# Patient Record
Sex: FEMALE | Race: BLACK OR AFRICAN AMERICAN | Employment: OTHER | URBAN - METROPOLITAN AREA
[De-identification: names, ages, dates, MRNs, and addresses within clinical notes are randomized per-mention and may not be internally consistent; named-entity substitution may affect disease eponyms.]

---

## 2022-06-18 ENCOUNTER — APPOINTMENT (OUTPATIENT)
Dept: CT IMAGING | Age: 87
DRG: 682 | End: 2022-06-18
Payer: COMMERCIAL

## 2022-06-18 ENCOUNTER — HOSPITAL ENCOUNTER (INPATIENT)
Age: 87
LOS: 6 days | Discharge: NURSING, REHAB OR LONG-TERM CARE | DRG: 682 | End: 2022-06-24
Attending: STUDENT IN AN ORGANIZED HEALTH CARE EDUCATION/TRAINING PROGRAM | Admitting: INTERNAL MEDICINE
Payer: COMMERCIAL

## 2022-06-18 ENCOUNTER — APPOINTMENT (OUTPATIENT)
Dept: GENERAL RADIOLOGY | Age: 87
DRG: 682 | End: 2022-06-18
Payer: COMMERCIAL

## 2022-06-18 DIAGNOSIS — I21.4 NSTEMI (NON-ST ELEVATED MYOCARDIAL INFARCTION) (HCC): ICD-10-CM

## 2022-06-18 DIAGNOSIS — R41.82 ALTERED MENTAL STATUS, UNSPECIFIED ALTERED MENTAL STATUS TYPE: Primary | ICD-10-CM

## 2022-06-18 LAB
ALBUMIN SERPL-MCNC: 3.6 G/DL (ref 3.4–5)
ALP BLD-CCNC: 54 U/L (ref 40–129)
ALT SERPL-CCNC: 222 U/L (ref 10–40)
ANION GAP SERPL CALCULATED.3IONS-SCNC: 13 MMOL/L (ref 3–16)
APTT: 20.7 SEC (ref 23–34.3)
AST SERPL-CCNC: 472 U/L (ref 15–37)
BACTERIA: ABNORMAL /HPF
BASOPHILS ABSOLUTE: 0 K/UL (ref 0–0.2)
BASOPHILS ABSOLUTE: 0.1 K/UL (ref 0–0.2)
BASOPHILS RELATIVE PERCENT: 0.2 %
BASOPHILS RELATIVE PERCENT: 0.3 %
BILIRUB SERPL-MCNC: 0.8 MG/DL (ref 0–1)
BILIRUBIN DIRECT: <0.2 MG/DL (ref 0–0.3)
BILIRUBIN URINE: NEGATIVE
BILIRUBIN, INDIRECT: ABNORMAL MG/DL (ref 0–1)
BLOOD SMEAR REVIEW: NORMAL
BLOOD, URINE: ABNORMAL
BUN BLDV-MCNC: 52 MG/DL (ref 7–20)
CALCIUM SERPL-MCNC: 10.3 MG/DL (ref 8.3–10.6)
CHLORIDE BLD-SCNC: 110 MMOL/L (ref 99–110)
CLARITY: ABNORMAL
CO2: 19 MMOL/L (ref 21–32)
COLOR: YELLOW
CREAT SERPL-MCNC: 3.3 MG/DL (ref 0.6–1.2)
EOSINOPHILS ABSOLUTE: 0 K/UL (ref 0–0.6)
EOSINOPHILS ABSOLUTE: 0 K/UL (ref 0–0.6)
EOSINOPHILS RELATIVE PERCENT: 0 %
EOSINOPHILS RELATIVE PERCENT: 0 %
GFR AFRICAN AMERICAN: 16
GFR NON-AFRICAN AMERICAN: 13
GLUCOSE BLD-MCNC: 133 MG/DL (ref 70–99)
GLUCOSE BLD-MCNC: 141 MG/DL (ref 70–99)
GLUCOSE BLD-MCNC: 171 MG/DL (ref 70–99)
GLUCOSE BLD-MCNC: 212 MG/DL (ref 70–99)
GLUCOSE BLD-MCNC: 36 MG/DL (ref 70–99)
GLUCOSE BLD-MCNC: 47 MG/DL (ref 70–99)
GLUCOSE BLD-MCNC: 48 MG/DL (ref 70–99)
GLUCOSE BLD-MCNC: 60 MG/DL (ref 70–99)
GLUCOSE BLD-MCNC: 86 MG/DL (ref 70–99)
GLUCOSE URINE: NEGATIVE MG/DL
HCT VFR BLD CALC: 23.7 % (ref 36–48)
HCT VFR BLD CALC: 25 % (ref 36–48)
HEMOGLOBIN: 7.7 G/DL (ref 12–16)
HEMOGLOBIN: 8.2 G/DL (ref 12–16)
HYPOCHROMIA: ABNORMAL
INR BLD: 1.28 (ref 0.87–1.14)
KETONES, URINE: NEGATIVE MG/DL
LEUKOCYTE ESTERASE, URINE: NEGATIVE
LYMPHOCYTES ABSOLUTE: 0.9 K/UL (ref 1–5.1)
LYMPHOCYTES ABSOLUTE: 1.2 K/UL (ref 1–5.1)
LYMPHOCYTES RELATIVE PERCENT: 6.4 %
LYMPHOCYTES RELATIVE PERCENT: 6.7 %
MCH RBC QN AUTO: 30.5 PG (ref 26–34)
MCH RBC QN AUTO: 30.7 PG (ref 26–34)
MCHC RBC AUTO-ENTMCNC: 32.4 G/DL (ref 31–36)
MCHC RBC AUTO-ENTMCNC: 32.8 G/DL (ref 31–36)
MCV RBC AUTO: 93.5 FL (ref 80–100)
MCV RBC AUTO: 94.3 FL (ref 80–100)
MICROSCOPIC EXAMINATION: YES
MONOCYTES ABSOLUTE: 1.1 K/UL (ref 0–1.3)
MONOCYTES ABSOLUTE: 1.6 K/UL (ref 0–1.3)
MONOCYTES RELATIVE PERCENT: 8 %
MONOCYTES RELATIVE PERCENT: 9.1 %
NEUTROPHILS ABSOLUTE: 11.9 K/UL (ref 1.7–7.7)
NEUTROPHILS ABSOLUTE: 15.2 K/UL (ref 1.7–7.7)
NEUTROPHILS RELATIVE PERCENT: 84.2 %
NEUTROPHILS RELATIVE PERCENT: 85.1 %
NITRITE, URINE: NEGATIVE
PDW BLD-RTO: 13.3 % (ref 12.4–15.4)
PDW BLD-RTO: 13.6 % (ref 12.4–15.4)
PERFORMED ON: ABNORMAL
PERFORMED ON: NORMAL
PH UA: 5.5 (ref 5–8)
PLATELET # BLD: 45 K/UL (ref 135–450)
PLATELET # BLD: 60 K/UL (ref 135–450)
PLATELET SLIDE REVIEW: ABNORMAL
PMV BLD AUTO: 10.9 FL (ref 5–10.5)
PMV BLD AUTO: 10.9 FL (ref 5–10.5)
POLYCHROMASIA: ABNORMAL
POTASSIUM REFLEX MAGNESIUM: 4.2 MMOL/L (ref 3.5–5.1)
PRO-BNP: 7360 PG/ML (ref 0–449)
PROCALCITONIN: 18.84 NG/ML (ref 0–0.15)
PROTEIN UA: 100 MG/DL
PROTHROMBIN TIME: 15.9 SEC (ref 11.7–14.5)
RBC # BLD: 2.51 M/UL (ref 4–5.2)
RBC # BLD: 2.67 M/UL (ref 4–5.2)
RBC UA: ABNORMAL /HPF (ref 0–4)
SLIDE REVIEW: ABNORMAL
SODIUM BLD-SCNC: 142 MMOL/L (ref 136–145)
SPECIFIC GRAVITY UA: >=1.03 (ref 1–1.03)
TOTAL PROTEIN: 6.7 G/DL (ref 6.4–8.2)
TROPONIN: 0.7 NG/ML
TROPONIN: 0.7 NG/ML
URINE REFLEX TO CULTURE: YES
URINE TYPE: ABNORMAL
UROBILINOGEN, URINE: 0.2 E.U./DL
WBC # BLD: 14 K/UL (ref 4–11)
WBC # BLD: 18.1 K/UL (ref 4–11)
WBC UA: ABNORMAL /HPF (ref 0–5)

## 2022-06-18 PROCEDURE — 84484 ASSAY OF TROPONIN QUANT: CPT

## 2022-06-18 PROCEDURE — 82607 VITAMIN B-12: CPT

## 2022-06-18 PROCEDURE — 74176 CT ABD & PELVIS W/O CONTRAST: CPT

## 2022-06-18 PROCEDURE — 6360000002 HC RX W HCPCS: Performed by: STUDENT IN AN ORGANIZED HEALTH CARE EDUCATION/TRAINING PROGRAM

## 2022-06-18 PROCEDURE — 87077 CULTURE AEROBIC IDENTIFY: CPT

## 2022-06-18 PROCEDURE — 51702 INSERT TEMP BLADDER CATH: CPT

## 2022-06-18 PROCEDURE — 83550 IRON BINDING TEST: CPT

## 2022-06-18 PROCEDURE — 6370000000 HC RX 637 (ALT 250 FOR IP): Performed by: STUDENT IN AN ORGANIZED HEALTH CARE EDUCATION/TRAINING PROGRAM

## 2022-06-18 PROCEDURE — 93005 ELECTROCARDIOGRAM TRACING: CPT | Performed by: STUDENT IN AN ORGANIZED HEALTH CARE EDUCATION/TRAINING PROGRAM

## 2022-06-18 PROCEDURE — 87186 SC STD MICRODIL/AGAR DIL: CPT

## 2022-06-18 PROCEDURE — 36415 COLL VENOUS BLD VENIPUNCTURE: CPT

## 2022-06-18 PROCEDURE — 80076 HEPATIC FUNCTION PANEL: CPT

## 2022-06-18 PROCEDURE — 99285 EMERGENCY DEPT VISIT HI MDM: CPT

## 2022-06-18 PROCEDURE — 2060000000 HC ICU INTERMEDIATE R&B

## 2022-06-18 PROCEDURE — 70450 CT HEAD/BRAIN W/O DYE: CPT

## 2022-06-18 PROCEDURE — 87040 BLOOD CULTURE FOR BACTERIA: CPT

## 2022-06-18 PROCEDURE — 85610 PROTHROMBIN TIME: CPT

## 2022-06-18 PROCEDURE — 85730 THROMBOPLASTIN TIME PARTIAL: CPT

## 2022-06-18 PROCEDURE — 81001 URINALYSIS AUTO W/SCOPE: CPT

## 2022-06-18 PROCEDURE — 80048 BASIC METABOLIC PNL TOTAL CA: CPT

## 2022-06-18 PROCEDURE — 87086 URINE CULTURE/COLONY COUNT: CPT

## 2022-06-18 PROCEDURE — 84145 PROCALCITONIN (PCT): CPT

## 2022-06-18 PROCEDURE — 2580000003 HC RX 258: Performed by: STUDENT IN AN ORGANIZED HEALTH CARE EDUCATION/TRAINING PROGRAM

## 2022-06-18 PROCEDURE — 82746 ASSAY OF FOLIC ACID SERUM: CPT

## 2022-06-18 PROCEDURE — 85025 COMPLETE CBC W/AUTO DIFF WBC: CPT

## 2022-06-18 PROCEDURE — 83540 ASSAY OF IRON: CPT

## 2022-06-18 PROCEDURE — 71045 X-RAY EXAM CHEST 1 VIEW: CPT

## 2022-06-18 PROCEDURE — 83880 ASSAY OF NATRIURETIC PEPTIDE: CPT

## 2022-06-18 RX ORDER — HEPARIN SODIUM 1000 [USP'U]/ML
60 INJECTION, SOLUTION INTRAVENOUS; SUBCUTANEOUS ONCE
Status: COMPLETED | OUTPATIENT
Start: 2022-06-18 | End: 2022-06-18

## 2022-06-18 RX ORDER — HEPARIN SODIUM 10000 [USP'U]/100ML
5-30 INJECTION, SOLUTION INTRAVENOUS CONTINUOUS
Status: DISCONTINUED | OUTPATIENT
Start: 2022-06-18 | End: 2022-06-18

## 2022-06-18 RX ORDER — SODIUM CHLORIDE 0.9 % (FLUSH) 0.9 %
5-40 SYRINGE (ML) INJECTION EVERY 12 HOURS SCHEDULED
Status: DISCONTINUED | OUTPATIENT
Start: 2022-06-18 | End: 2022-06-24 | Stop reason: HOSPADM

## 2022-06-18 RX ORDER — ONDANSETRON 4 MG/1
4 TABLET, ORALLY DISINTEGRATING ORAL EVERY 8 HOURS PRN
Status: DISCONTINUED | OUTPATIENT
Start: 2022-06-18 | End: 2022-06-24 | Stop reason: HOSPADM

## 2022-06-18 RX ORDER — ATORVASTATIN CALCIUM 40 MG/1
40 TABLET, FILM COATED ORAL NIGHTLY
Status: DISCONTINUED | OUTPATIENT
Start: 2022-06-18 | End: 2022-06-19

## 2022-06-18 RX ORDER — DONEPEZIL HYDROCHLORIDE 10 MG/1
10 TABLET, FILM COATED ORAL NIGHTLY
Status: DISCONTINUED | OUTPATIENT
Start: 2022-06-18 | End: 2022-06-18

## 2022-06-18 RX ORDER — ASPIRIN 300 MG/1
300 SUPPOSITORY RECTAL DAILY
Status: DISCONTINUED | OUTPATIENT
Start: 2022-06-19 | End: 2022-06-19

## 2022-06-18 RX ORDER — ACETAMINOPHEN 325 MG/1
650 TABLET ORAL EVERY 6 HOURS PRN
Status: DISCONTINUED | OUTPATIENT
Start: 2022-06-18 | End: 2022-06-24 | Stop reason: HOSPADM

## 2022-06-18 RX ORDER — ONDANSETRON 2 MG/ML
4 INJECTION INTRAMUSCULAR; INTRAVENOUS EVERY 6 HOURS PRN
Status: DISCONTINUED | OUTPATIENT
Start: 2022-06-18 | End: 2022-06-24 | Stop reason: HOSPADM

## 2022-06-18 RX ORDER — SODIUM CHLORIDE 9 MG/ML
INJECTION, SOLUTION INTRAVENOUS CONTINUOUS
Status: DISCONTINUED | OUTPATIENT
Start: 2022-06-18 | End: 2022-06-19

## 2022-06-18 RX ORDER — HEPARIN SODIUM 1000 [USP'U]/ML
60 INJECTION, SOLUTION INTRAVENOUS; SUBCUTANEOUS PRN
Status: DISCONTINUED | OUTPATIENT
Start: 2022-06-18 | End: 2022-06-18

## 2022-06-18 RX ORDER — DULOXETIN HYDROCHLORIDE 20 MG/1
20 CAPSULE, DELAYED RELEASE ORAL DAILY
Status: DISCONTINUED | OUTPATIENT
Start: 2022-06-18 | End: 2022-06-18

## 2022-06-18 RX ORDER — DEXTROSE MONOHYDRATE 50 MG/ML
INJECTION, SOLUTION INTRAVENOUS CONTINUOUS
Status: DISCONTINUED | OUTPATIENT
Start: 2022-06-18 | End: 2022-06-19

## 2022-06-18 RX ORDER — ASPIRIN 81 MG/1
81 TABLET, CHEWABLE ORAL DAILY
Status: CANCELLED | OUTPATIENT
Start: 2022-06-19

## 2022-06-18 RX ORDER — ACETAMINOPHEN 650 MG/1
650 SUPPOSITORY RECTAL EVERY 6 HOURS PRN
Status: DISCONTINUED | OUTPATIENT
Start: 2022-06-18 | End: 2022-06-24 | Stop reason: HOSPADM

## 2022-06-18 RX ORDER — ASPIRIN 300 MG/1
300 SUPPOSITORY RECTAL ONCE
Status: COMPLETED | OUTPATIENT
Start: 2022-06-18 | End: 2022-06-18

## 2022-06-18 RX ORDER — SODIUM CHLORIDE 0.9 % (FLUSH) 0.9 %
5-40 SYRINGE (ML) INJECTION PRN
Status: DISCONTINUED | OUTPATIENT
Start: 2022-06-18 | End: 2022-06-24 | Stop reason: HOSPADM

## 2022-06-18 RX ORDER — SODIUM CHLORIDE 9 MG/ML
INJECTION, SOLUTION INTRAVENOUS PRN
Status: DISCONTINUED | OUTPATIENT
Start: 2022-06-18 | End: 2022-06-24 | Stop reason: HOSPADM

## 2022-06-18 RX ORDER — HEPARIN SODIUM 1000 [USP'U]/ML
30 INJECTION, SOLUTION INTRAVENOUS; SUBCUTANEOUS PRN
Status: DISCONTINUED | OUTPATIENT
Start: 2022-06-18 | End: 2022-06-18

## 2022-06-18 RX ORDER — POLYETHYLENE GLYCOL 3350 17 G/17G
17 POWDER, FOR SOLUTION ORAL DAILY PRN
Status: DISCONTINUED | OUTPATIENT
Start: 2022-06-18 | End: 2022-06-24 | Stop reason: HOSPADM

## 2022-06-18 RX ORDER — DEXTROSE MONOHYDRATE 50 MG/ML
100 INJECTION, SOLUTION INTRAVENOUS PRN
Status: DISCONTINUED | OUTPATIENT
Start: 2022-06-18 | End: 2022-06-24 | Stop reason: HOSPADM

## 2022-06-18 RX ADMIN — DEXTROSE MONOHYDRATE 125 ML: 10 INJECTION, SOLUTION INTRAVENOUS at 17:35

## 2022-06-18 RX ADMIN — CEFEPIME HYDROCHLORIDE 1000 MG: 1 INJECTION, POWDER, FOR SOLUTION INTRAMUSCULAR; INTRAVENOUS at 19:36

## 2022-06-18 RX ADMIN — DEXTROSE MONOHYDRATE: 50 INJECTION, SOLUTION INTRAVENOUS at 19:29

## 2022-06-18 RX ADMIN — ASPIRIN 300 MG: 300 SUPPOSITORY RECTAL at 12:29

## 2022-06-18 RX ADMIN — DEXTROSE MONOHYDRATE 125 ML: 10 INJECTION, SOLUTION INTRAVENOUS at 18:17

## 2022-06-18 RX ADMIN — HEPARIN SODIUM AND DEXTROSE 12 UNITS/KG/HR: 10000; 5 INJECTION INTRAVENOUS at 13:27

## 2022-06-18 RX ADMIN — DEXTROSE MONOHYDRATE 125 ML: 10 INJECTION, SOLUTION INTRAVENOUS at 17:55

## 2022-06-18 RX ADMIN — HEPARIN SODIUM 2950 UNITS: 1000 INJECTION, SOLUTION INTRAVENOUS; SUBCUTANEOUS at 13:22

## 2022-06-18 RX ADMIN — DEXTROSE MONOHYDRATE 250 ML: 10 INJECTION, SOLUTION INTRAVENOUS at 19:02

## 2022-06-18 ASSESSMENT — PAIN SCALES - WONG BAKER
WONGBAKER_NUMERICALRESPONSE: 0

## 2022-06-18 ASSESSMENT — PAIN - FUNCTIONAL ASSESSMENT
PAIN_FUNCTIONAL_ASSESSMENT: CRITICAL CARE PAIN OBSERVATION TOOL (CPOT)
PAIN_FUNCTIONAL_ASSESSMENT: NONE - DENIES PAIN

## 2022-06-18 NOTE — ED PROVIDER NOTES
ED Attending Attestation Note     Date of evaluation: 6/18/2022    This patient was seen by the resident. I have seen and examined the patient, agree with the workup, evaluation, management and diagnosis. The care plan has been discussed. I have reviewed the ECG and concur with the resident's interpretation. My assessment reveals patient brought in by EMS with concern for STEMI with elevations in the lateral and inferior leads. Hemodynamically stable with a mildly hypoxic on arrival.  She seems to be altered though there is unclear what her baseline is. EMS states that family is coming to bedside. On exam, she is initially not following commands. She does verbalize still is difficult to understand what she is saying. Equal radial pulses on initial examination. Abdomen is soft and nondistended and nontender. Breath sounds equal bilaterally. EKG does show elevations in inferior and lateral leads. We will activate Cath Lab and speak with our interventional list due to concern for STEMI. Due to her altered mental status, we will also obtain a rapid CT head to look for any potential bleeding.      Renato Wright MD  06/18/22 [id number]

## 2022-06-18 NOTE — H&P
Conjunctiva/sclera: Conjunctivae normal.   Cardiovascular:      Rate and Rhythm: Normal rate and regular rhythm. Pulmonary:      Effort: Pulmonary effort is normal. No respiratory distress. Breath sounds: Normal breath sounds. No wheezing or rales. Abdominal:      General: Abdomen is flat. Palpations: Abdomen is soft. Musculoskeletal:      Comments: LLE skin dry indurated, open skin wound   Skin:     General: Skin is dry. Neurological:      Mental Status: She is disoriented. Physical exam:       Vitals:    06/18/22 1321   BP:    Pulse: 62   Resp: 14   Temp: 99 °F (37.2 °C)   SpO2:        DATA:    Labs:  CBC:   Recent Labs     06/18/22  1135   WBC 14.0*   HGB 7.7*   HCT 23.7*   PLT 60*       BMP:   Recent Labs     06/18/22  1135      K 4.2      CO2 19*   BUN 52*   CREATININE 3.3*   GLUCOSE 133*     LFT's: No results for input(s): AST, ALT, ALB, BILITOT, ALKPHOS in the last 72 hours. Troponin:   Recent Labs     06/18/22  1135   TROPONINI 0.70*     BNP:No results for input(s): BNP in the last 72 hours. ABGs: No results for input(s): PHART, SHW6FNP, PO2ART in the last 72 hours. INR:   Recent Labs     06/18/22  1136   INR 1.28*       U/A:No results for input(s): NITRITE, COLORU, PHUR, LABCAST, WBCUA, RBCUA, MUCUS, TRICHOMONAS, YEAST, BACTERIA, CLARITYU, SPECGRAV, LEUKOCYTESUR, UROBILINOGEN, BILIRUBINUR, BLOODU, GLUCOSEU, AMORPHOUS in the last 72 hours. Invalid input(s): KETONESU    XR CHEST PORTABLE   Final Result      No acute cardiopulmonary findings. CT HEAD WO CONTRAST   Final Result      Cerebral atrophy. Evidence of diffuse chronic small vessel white matter disease bilaterally. No acute intracranial findings. ASSESSMENT AND PLAN:    STEMI  Pt has reported history of CAD. ECHO 2013 EF 45-50%. Pt with ST elevations in lateral and inferior leads.  Code STEMI called though pt was too lethargic to undergo any intervention  Given ASA 300mg on CKD. Will place on gentle IVF and follow serially. Consider nephrology consult in am if needed  Evolving sepsis: WBC rising and procal elevated. Obtain cultures.  CT A/P and start empiric broad coverage for now  Anemia, thrombocytopenia:  Obtain DIC labs, nutritional markers      Stephanie Ha MD

## 2022-06-18 NOTE — Clinical Note
Discharge Plan[de-identified] Other/[facility])   Telemetry/Cardiac Monitoring Required?: Yes

## 2022-06-18 NOTE — PROGRESS NOTES
Spoke with on-call cardiologist Dr. Ebonie Young regarding continuing heparin gtt due to thrombocytopenia. He stated that pt unlikely to have STEMI due to ST elevations being diffuse, did not recommend starting pt on heparin gtt. Suggesting looking for other underlying causes for ST elevations and AMS, and to consult non-interventional cardiology if needed. This was discussed with attending physician Dr. Dean Marks, night team was updated. Heparin gtt was discontinued. Troponins, EKG, CBC will be trended. Will await further recommendations from cardiology.

## 2022-06-19 ENCOUNTER — APPOINTMENT (OUTPATIENT)
Dept: CT IMAGING | Age: 87
DRG: 682 | End: 2022-06-19
Payer: COMMERCIAL

## 2022-06-19 ENCOUNTER — APPOINTMENT (OUTPATIENT)
Dept: MRI IMAGING | Age: 87
DRG: 682 | End: 2022-06-19
Payer: COMMERCIAL

## 2022-06-19 LAB
ACETAMINOPHEN LEVEL: <5 UG/ML (ref 10–30)
ALBUMIN SERPL-MCNC: 3.1 G/DL (ref 3.4–5)
ALP BLD-CCNC: 51 U/L (ref 40–129)
ALT SERPL-CCNC: 389 U/L (ref 10–40)
AMMONIA: 34 UMOL/L (ref 11–51)
AMPHETAMINE SCREEN, URINE: NORMAL
ANION GAP SERPL CALCULATED.3IONS-SCNC: 15 MMOL/L (ref 3–16)
ANION GAP SERPL CALCULATED.3IONS-SCNC: 15 MMOL/L (ref 3–16)
AST SERPL-CCNC: 529 U/L (ref 15–37)
BARBITURATE SCREEN URINE: NORMAL
BASOPHILS ABSOLUTE: 0 K/UL (ref 0–0.2)
BASOPHILS ABSOLUTE: 0 K/UL (ref 0–0.2)
BASOPHILS RELATIVE PERCENT: 0.1 %
BASOPHILS RELATIVE PERCENT: 0.1 %
BENZODIAZEPINE SCREEN, URINE: NORMAL
BILIRUB SERPL-MCNC: 0.6 MG/DL (ref 0–1)
BILIRUBIN DIRECT: <0.2 MG/DL (ref 0–0.3)
BILIRUBIN, INDIRECT: ABNORMAL MG/DL (ref 0–1)
BUN BLDV-MCNC: 55 MG/DL (ref 7–20)
BUN BLDV-MCNC: 57 MG/DL (ref 7–20)
CALCIUM SERPL-MCNC: 9.2 MG/DL (ref 8.3–10.6)
CALCIUM SERPL-MCNC: 9.5 MG/DL (ref 8.3–10.6)
CANNABINOID SCREEN URINE: NORMAL
CHLORIDE BLD-SCNC: 105 MMOL/L (ref 99–110)
CHLORIDE BLD-SCNC: 105 MMOL/L (ref 99–110)
CHOLESTEROL, TOTAL: 125 MG/DL (ref 0–199)
CO2: 17 MMOL/L (ref 21–32)
CO2: 18 MMOL/L (ref 21–32)
COCAINE METABOLITE SCREEN URINE: NORMAL
CREAT SERPL-MCNC: 2.8 MG/DL (ref 0.6–1.2)
CREAT SERPL-MCNC: 2.9 MG/DL (ref 0.6–1.2)
CREATININE URINE: 55.4 MG/DL (ref 28–259)
CREATININE URINE: 55.5 MG/DL (ref 28–259)
EOSINOPHILS ABSOLUTE: 0 K/UL (ref 0–0.6)
EOSINOPHILS ABSOLUTE: 0 K/UL (ref 0–0.6)
EOSINOPHILS RELATIVE PERCENT: 0 %
EOSINOPHILS RELATIVE PERCENT: 0.1 %
FOLATE: >20 NG/ML (ref 4.78–24.2)
GFR AFRICAN AMERICAN: 19
GFR AFRICAN AMERICAN: 19
GFR NON-AFRICAN AMERICAN: 15
GFR NON-AFRICAN AMERICAN: 16
GLUCOSE BLD-MCNC: 108 MG/DL (ref 70–99)
GLUCOSE BLD-MCNC: 108 MG/DL (ref 70–99)
GLUCOSE BLD-MCNC: 113 MG/DL (ref 70–99)
GLUCOSE BLD-MCNC: 123 MG/DL (ref 70–99)
GLUCOSE BLD-MCNC: 128 MG/DL (ref 70–99)
GLUCOSE BLD-MCNC: 129 MG/DL (ref 70–99)
GLUCOSE BLD-MCNC: 129 MG/DL (ref 70–99)
GLUCOSE BLD-MCNC: 142 MG/DL (ref 70–99)
GLUCOSE BLD-MCNC: 145 MG/DL (ref 70–99)
GLUCOSE BLD-MCNC: 162 MG/DL (ref 70–99)
GLUCOSE BLD-MCNC: 50 MG/DL (ref 70–99)
GLUCOSE BLD-MCNC: 56 MG/DL (ref 70–99)
GLUCOSE BLD-MCNC: 59 MG/DL (ref 70–99)
GLUCOSE BLD-MCNC: 65 MG/DL (ref 70–99)
GLUCOSE BLD-MCNC: 68 MG/DL (ref 70–99)
GLUCOSE BLD-MCNC: 68 MG/DL (ref 70–99)
GLUCOSE BLD-MCNC: 69 MG/DL (ref 70–99)
GLUCOSE BLD-MCNC: 69 MG/DL (ref 70–99)
GLUCOSE BLD-MCNC: 71 MG/DL (ref 70–99)
GLUCOSE BLD-MCNC: 77 MG/DL (ref 70–99)
GLUCOSE BLD-MCNC: 94 MG/DL (ref 70–99)
GLUCOSE BLD-MCNC: 95 MG/DL (ref 70–99)
HAV IGM SER IA-ACNC: NORMAL
HCT VFR BLD CALC: 22.4 % (ref 36–48)
HCT VFR BLD CALC: 22.4 % (ref 36–48)
HDLC SERPL-MCNC: 41 MG/DL (ref 40–60)
HEMOGLOBIN: 7.3 G/DL (ref 12–16)
HEMOGLOBIN: 7.3 G/DL (ref 12–16)
HEPATITIS B CORE IGM ANTIBODY: NORMAL
HEPATITIS B SURFACE ANTIGEN INTERPRETATION: NORMAL
HEPATITIS C ANTIBODY INTERPRETATION: NORMAL
IRON SATURATION: 35 % (ref 15–50)
IRON: 66 UG/DL (ref 37–145)
LACTIC ACID: 2 MMOL/L (ref 0.4–2)
LACTIC ACID: 2.1 MMOL/L (ref 0.4–2)
LACTIC ACID: 3.8 MMOL/L (ref 0.4–2)
LDL CHOLESTEROL CALCULATED: 63 MG/DL
LYMPHOCYTES ABSOLUTE: 0.7 K/UL (ref 1–5.1)
LYMPHOCYTES ABSOLUTE: 1.1 K/UL (ref 1–5.1)
LYMPHOCYTES RELATIVE PERCENT: 6.1 %
LYMPHOCYTES RELATIVE PERCENT: 8.1 %
Lab: NORMAL
MAGNESIUM: 1.5 MG/DL (ref 1.8–2.4)
MAGNESIUM: 1.9 MG/DL (ref 1.8–2.4)
MCH RBC QN AUTO: 30.2 PG (ref 26–34)
MCH RBC QN AUTO: 30.2 PG (ref 26–34)
MCHC RBC AUTO-ENTMCNC: 32.4 G/DL (ref 31–36)
MCHC RBC AUTO-ENTMCNC: 32.7 G/DL (ref 31–36)
MCV RBC AUTO: 92.4 FL (ref 80–100)
MCV RBC AUTO: 93.1 FL (ref 80–100)
METHADONE SCREEN, URINE: NORMAL
MONOCYTES ABSOLUTE: 0.8 K/UL (ref 0–1.3)
MONOCYTES ABSOLUTE: 1.1 K/UL (ref 0–1.3)
MONOCYTES RELATIVE PERCENT: 6.5 %
MONOCYTES RELATIVE PERCENT: 8.6 %
NEUTROPHILS ABSOLUTE: 10.6 K/UL (ref 1.7–7.7)
NEUTROPHILS ABSOLUTE: 11 K/UL (ref 1.7–7.7)
NEUTROPHILS RELATIVE PERCENT: 83.2 %
NEUTROPHILS RELATIVE PERCENT: 87.2 %
OPIATE SCREEN URINE: NORMAL
OXYCODONE URINE: NORMAL
PDW BLD-RTO: 13 % (ref 12.4–15.4)
PDW BLD-RTO: 13.3 % (ref 12.4–15.4)
PERFORMED ON: ABNORMAL
PERFORMED ON: NORMAL
PH UA: 3
PHENCYCLIDINE SCREEN URINE: NORMAL
PLATELET # BLD: 41 K/UL (ref 135–450)
PLATELET # BLD: 44 K/UL (ref 135–450)
PMV BLD AUTO: 10.6 FL (ref 5–10.5)
PMV BLD AUTO: 11.1 FL (ref 5–10.5)
POTASSIUM SERPL-SCNC: 4.1 MMOL/L (ref 3.5–5.1)
POTASSIUM SERPL-SCNC: 4.4 MMOL/L (ref 3.5–5.1)
PROPOXYPHENE SCREEN: NORMAL
PROTEIN PROTEIN: 28 MG/DL
PROTEIN/CREAT RATIO: 0.5 MG/DL
RBC # BLD: 2.4 M/UL (ref 4–5.2)
RBC # BLD: 2.42 M/UL (ref 4–5.2)
SODIUM BLD-SCNC: 137 MMOL/L (ref 136–145)
SODIUM BLD-SCNC: 138 MMOL/L (ref 136–145)
SODIUM URINE: 40 MMOL/L
TOTAL CK: 6133 U/L (ref 26–192)
TOTAL IRON BINDING CAPACITY: 187 UG/DL (ref 260–445)
TOTAL PROTEIN: 6 G/DL (ref 6.4–8.2)
TRIGL SERPL-MCNC: 103 MG/DL (ref 0–150)
TROPONIN: 0.46 NG/ML
TROPONIN: 0.5 NG/ML
TROPONIN: 0.53 NG/ML
TROPONIN: 0.57 NG/ML
VANCOMYCIN RANDOM: <4 UG/ML
VITAMIN B-12: 1544 PG/ML (ref 211–911)
VLDLC SERPL CALC-MCNC: 21 MG/DL
WBC # BLD: 12.2 K/UL (ref 4–11)
WBC # BLD: 13.2 K/UL (ref 4–11)

## 2022-06-19 PROCEDURE — 80143 DRUG ASSAY ACETAMINOPHEN: CPT

## 2022-06-19 PROCEDURE — 85025 COMPLETE CBC W/AUTO DIFF WBC: CPT

## 2022-06-19 PROCEDURE — 6360000002 HC RX W HCPCS: Performed by: STUDENT IN AN ORGANIZED HEALTH CARE EDUCATION/TRAINING PROGRAM

## 2022-06-19 PROCEDURE — 84300 ASSAY OF URINE SODIUM: CPT

## 2022-06-19 PROCEDURE — 84484 ASSAY OF TROPONIN QUANT: CPT

## 2022-06-19 PROCEDURE — 2060000000 HC ICU INTERMEDIATE R&B

## 2022-06-19 PROCEDURE — 80048 BASIC METABOLIC PNL TOTAL CA: CPT

## 2022-06-19 PROCEDURE — 70551 MRI BRAIN STEM W/O DYE: CPT

## 2022-06-19 PROCEDURE — 80074 ACUTE HEPATITIS PANEL: CPT

## 2022-06-19 PROCEDURE — 93005 ELECTROCARDIOGRAM TRACING: CPT | Performed by: STUDENT IN AN ORGANIZED HEALTH CARE EDUCATION/TRAINING PROGRAM

## 2022-06-19 PROCEDURE — 80076 HEPATIC FUNCTION PANEL: CPT

## 2022-06-19 PROCEDURE — 84156 ASSAY OF PROTEIN URINE: CPT

## 2022-06-19 PROCEDURE — 82570 ASSAY OF URINE CREATININE: CPT

## 2022-06-19 PROCEDURE — 51702 INSERT TEMP BLADDER CATH: CPT

## 2022-06-19 PROCEDURE — 99223 1ST HOSP IP/OBS HIGH 75: CPT | Performed by: INTERNAL MEDICINE

## 2022-06-19 PROCEDURE — 83605 ASSAY OF LACTIC ACID: CPT

## 2022-06-19 PROCEDURE — 82550 ASSAY OF CK (CPK): CPT

## 2022-06-19 PROCEDURE — 80202 ASSAY OF VANCOMYCIN: CPT

## 2022-06-19 PROCEDURE — 36415 COLL VENOUS BLD VENIPUNCTURE: CPT

## 2022-06-19 PROCEDURE — 80307 DRUG TEST PRSMV CHEM ANLYZR: CPT

## 2022-06-19 PROCEDURE — 83735 ASSAY OF MAGNESIUM: CPT

## 2022-06-19 PROCEDURE — 71250 CT THORAX DX C-: CPT

## 2022-06-19 PROCEDURE — 87641 MR-STAPH DNA AMP PROBE: CPT

## 2022-06-19 PROCEDURE — 82947 ASSAY GLUCOSE BLOOD QUANT: CPT

## 2022-06-19 PROCEDURE — 82140 ASSAY OF AMMONIA: CPT

## 2022-06-19 PROCEDURE — 2580000003 HC RX 258: Performed by: STUDENT IN AN ORGANIZED HEALTH CARE EDUCATION/TRAINING PROGRAM

## 2022-06-19 PROCEDURE — 6360000002 HC RX W HCPCS

## 2022-06-19 PROCEDURE — 80061 LIPID PANEL: CPT

## 2022-06-19 RX ORDER — SODIUM CHLORIDE 9 MG/ML
INJECTION, SOLUTION INTRAVENOUS CONTINUOUS
Status: ACTIVE | OUTPATIENT
Start: 2022-06-19 | End: 2022-06-20

## 2022-06-19 RX ORDER — HYDROCHLOROTHIAZIDE 12.5 MG/1
12.5 CAPSULE, GELATIN COATED ORAL 2 TIMES DAILY
Status: ON HOLD | COMMUNITY
End: 2022-06-23 | Stop reason: HOSPADM

## 2022-06-19 RX ORDER — MAGNESIUM SULFATE IN WATER 40 MG/ML
4000 INJECTION, SOLUTION INTRAVENOUS ONCE
Status: COMPLETED | OUTPATIENT
Start: 2022-06-19 | End: 2022-06-19

## 2022-06-19 RX ORDER — DEXTROSE MONOHYDRATE 25 G/50ML
25 INJECTION, SOLUTION INTRAVENOUS PRN
Status: DISCONTINUED | OUTPATIENT
Start: 2022-06-19 | End: 2022-06-19

## 2022-06-19 RX ORDER — OMEGA-3S/DHA/EPA/FISH OIL/D3 300MG-1000
400 CAPSULE ORAL DAILY
COMMUNITY

## 2022-06-19 RX ORDER — MULTIVIT WITH MINERALS/LUTEIN
250 TABLET ORAL DAILY
COMMUNITY

## 2022-06-19 RX ADMIN — DEXTROSE MONOHYDRATE 125 ML: 10 INJECTION, SOLUTION INTRAVENOUS at 05:23

## 2022-06-19 RX ADMIN — CEFEPIME HYDROCHLORIDE 1000 MG: 1 INJECTION, POWDER, FOR SOLUTION INTRAMUSCULAR; INTRAVENOUS at 11:37

## 2022-06-19 RX ADMIN — MAGNESIUM SULFATE HEPTAHYDRATE 4000 MG: 4 INJECTION, SOLUTION INTRAVENOUS at 03:34

## 2022-06-19 RX ADMIN — DEXTROSE MONOHYDRATE 125 ML: 10 INJECTION, SOLUTION INTRAVENOUS at 06:02

## 2022-06-19 RX ADMIN — DEXTROSE MONOHYDRATE 125 ML: 10 INJECTION, SOLUTION INTRAVENOUS at 09:07

## 2022-06-19 RX ADMIN — DEXTROSE MONOHYDRATE 125 ML: 10 INJECTION, SOLUTION INTRAVENOUS at 08:33

## 2022-06-19 RX ADMIN — SODIUM CHLORIDE, PRESERVATIVE FREE 5 ML: 5 INJECTION INTRAVENOUS at 19:58

## 2022-06-19 RX ADMIN — SODIUM CHLORIDE, PRESERVATIVE FREE 10 ML: 5 INJECTION INTRAVENOUS at 09:59

## 2022-06-19 RX ADMIN — VANCOMYCIN HYDROCHLORIDE 1250 MG: 1 INJECTION, POWDER, LYOPHILIZED, FOR SOLUTION INTRAVENOUS at 09:57

## 2022-06-19 RX ADMIN — SODIUM CHLORIDE: 9 INJECTION, SOLUTION INTRAVENOUS at 13:05

## 2022-06-19 RX ADMIN — CEFEPIME HYDROCHLORIDE 1000 MG: 1 INJECTION, POWDER, FOR SOLUTION INTRAMUSCULAR; INTRAVENOUS at 19:58

## 2022-06-19 RX ADMIN — DEXTROSE MONOHYDRATE: 50 INJECTION, SOLUTION INTRAVENOUS at 04:02

## 2022-06-19 ASSESSMENT — PAIN SCALES - WONG BAKER
WONGBAKER_NUMERICALRESPONSE: 0

## 2022-06-19 ASSESSMENT — VISUAL ACUITY: OU: 1

## 2022-06-19 ASSESSMENT — PAIN SCALES - GENERAL
PAINLEVEL_OUTOF10: 0

## 2022-06-19 NOTE — PROGRESS NOTES
Speech Language Pathology    SLP orders received; Evaluation attempted though patient not appropriate for speech/swallow assessment per RN as pt not alert/responsive. SLP to hold and see patient when appropriate to participate.        Rhoda Vela, [place], PAL [phone]  Pager [phone]

## 2022-06-19 NOTE — PROGRESS NOTES
Clinical Pharmacy Progress Note    Vancomycin - Management by Pharmacy    Consult Date(s): 06/18  Consulting Provider(s): Inder    Assessment / Plan    Sepsis of unknown etiology - Vancomycin   Concurrent Antimicrobials: Cefepime (Day #2)   Day of Vanc Therapy / Ordered Duration: 1/7   Current Dosing Method: Intermittent Dosing by Levels   Therapeutic Goal: ~ 15 mg/L   Current Dose / Frequency: Intermittent   Plan / Rationale:   o Patient with an MIGUELITO (Scr 2.8 mg/L, baseline unknown). o Pt was ordered a LD of 1250 mg Vancomycin last night. This was never given (hence vancomycin level this AM of <4 mg/L). o Spoke with RN, will give the 1250 mg (25 mg/kg) IV dose this AM.  o Will check a random level tomorrow AM.  Reji Thomas Will continue to monitor clinical condition and make adjustments to regimen as appropriate. Thank you for consulting Pharmacy! Arjun Davies, PharmD  PGY-1 Pharmacy Resident  [id number]  6/19/2022 7:47 AM        Subjective/Objective: Ms. Tammy Mccormick is a 82 y.o. female with a PMHx significant for dementia, HLD, CAD, CKD, HFrEF, admitted for AMS. Pharmacy has been consulted to dose vancomycin for sepsis. Ht Readings from Last 1 Encounters:   06/18/22 5' (1.524 m)     Wt Readings from Last 1 Encounters:   06/19/22 107 lb 2.3 oz (48.6 kg)       Current & Prior Antimicrobial Regimen(s):   Cefepime (06/18 - Current)   Vancomycin (06/19 - Current)    Level(s) / Doses:    Date Dose Level Notes   06/18 1250 mg IV  NOT GIVEN   06/19 1250 mg IV <4 mg/L Repeat dose that was not give   Note: Serum levels collected for AUC-based dosing may be high if collected in close proximity to the dose administered. This is not necessarily indicative of toxicity. Cultures & Sensitivities:    Date Site Micro Susceptibility / Result   06/18 Blood x2  Sent            Labs / Ancillary Data:    Estimated Creatinine Clearance: 11 mL/min (A) (based on SCr of 2.8 mg/dL (H)).     Recent Labs 06/18/22  1135 06/18/22  1408 06/19/22  0032 06/19/22  0528   CREATININE 3.3*  --  2.9* 2.8*   BUN 52*  --  57* 55*   WBC 14.0* 18.1* 13.2*  --        Additional Lab Values / Findings of Note:    Procalcitonin:   Recent Labs     06/18/22  1345   PROCAL 18.84*

## 2022-06-19 NOTE — CONSULTS
[address]   Cardiology Consultation   Date: 6/19/2022  Admit Date:  6/18/2022  Reason for Consultation: ? STEMI  Consult Requesting Physician: Jered Narvaez MD     Chief Complaint   Patient presents with    Altered Mental Status     usually A/O from home, per family has been unresponsive for about an hour, per EMS STEMI, withdraws from pain     HPI: Roseann Martin is a 82 y.o. past medical history significant for CKD, hypertension, hyperlipidemia, cardiomyopathy with LV ejection fraction of 45 to 50%, severe LVH, grade 3 diastolic dysfunction, no major obstructive CAD in 2013, was brought into the hospital via EMS secondary to altered mental status. History is limited from the patient. She is alert and awake. However, was not able to provide much history. In the ER, there was a concern about ST segment elevation and hence she was diagnosed with ST elevation MI. She was also confused in the ER. Interventional cardiology Dr. Leonel Alvarado was contacted from the ER. Secondary to atypical EKG changes and mental status changes, with no clear history of chest pain, she was not taken to the cardiac catheterization lab, very appropriately. Cardiology was consulted for further evaluation management. Patient continues to be lethargic. She would wake up and go back to sleep. Not able to get much history from the patient. Her EKG is consistent with a benign form of early repolarization syndrome. Her troponin started at 0.7 and the last troponin is 0.5. Her further lab work is consistent with possible sepsis. Impression:   1. NO STEMI  2. Early repolarization syndrome, benign form  3. Sepsis    Recommendations:  1. Please note that her EKG is not consistent with a ST segment elevation MI. It is more in favor of early repolarization syndrome. When compared to the previous EKG from 2015, there is no change in her EKG.   2.  Treatment for sepsis, as per the primary team  3.  Echocardiogram tomorrow  4. Agree that there is no need for IV anticoagulation    Dr. 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 No pertinent surgical history. No Known Allergies    Social History:  Reviewed. has an unknown smoking status. She has never used smokeless tobacco. She reports that she does not drink alcohol and does not use drugs. Family History:  Reviewed. family history is not on file. No premature CAD. Review of System:  All other systems reviewed except for that noted above. Pertinent negatives and positives are:     · General: negative for fever, chills   · Ophthalmic ROS: negative for - eye pain or loss of vision  · ENT ROS: negative for - headaches, sore throat   · Respiratory: negative for - cough, sputum  · Cardiovascular: Reviewed in HPI  · Gastrointestinal: negative for - abdominal pain, diarrhea, N/V  · Hematology: negative for - bleeding, blood clots, bruising or jaundice  · Genito-Urinary:  negative for - Dysuria or incontinence  · Musculoskeletal: negative for - Joint swelling, muscle pain  · Neurological: negative for - confusion, dizziness, headaches   · Psychiatric: No anxiety, no depression.   · Dermatological: negative for - rash    Physical Examination:  Vitals:    06/19/22 0320   BP: (!) 99/58   Pulse: 55   Resp: 14   Temp: 98.1 °F (36.7 °C)   SpO2: 100%        Intake/Output Summary (Last 24 hours) at 6/19/2022 1110  Last data filed at 6/19/2022 0320  Gross per 24 hour   Intake 1410.67 ml   Output 500 ml   Net 910.67 ml     In: 1410.7 [I.V.:1368.8]  Out: 500    Wt Readings from Last 3 Encounters:   06/19/22 107 lb 2.3 oz (48.6 kg)     Temp  Av °F (36.7 °C)  Min: 97.4 °F (36.3 °C)  Max: 99 °F (37.2 °C)  Pulse  Av.5  Min: 55  Max: 68  BP  Min: 99/58  Max: 152/66  SpO2  Av.5 %  Min: 97 %  Max: 100 %    · Telemetry: Sinus rhythm   · Constitutional: Alert. · Head: Normocephalic and atraumatic. · Mouth/Throat: Lips appear moist. Oropharynx is clear and moist.  · Eyes: Conjunctivae normal. EOM are normal.   · Neck: Neck supple. No lymphadenopathy. No rigidity. No JVD present. · Cardiovascular: Normal rate, regular rhythm. Normal S1&S2. Carotid pulse 2+ bilaterally. · Pulmonary/Chest: Bilateral respiratory sounds present. No respiratory accessory muscle use. No wheezes, No rhonchi. · Abdominal: Soft. Normal bowel sounds present. No distension, No tenderness. No splenomegaly. No hernia. · Musculoskeletal: No tenderness. No edema    · Lymphadenopathy: Has no cervical adenopathy. · Neurological: Alert and oriented. Cranial nerve II-XII grossly intact, No gross deficit to touch. · Skin: Skin is warm and dry. No rash, lesions, ulcerations noted. · Psychiatric: No anxiety nor agitation. Labs:  Reviewed. Recent Labs     22  1135 22  0032 22  0528    137 138   K 4.2 4.1 4.4    105 105   CO2 19* 17* 18*   BUN 52* 57* 55*   CREATININE 3.3* 2.9* 2.8*     Recent Labs     22  1135 22  1408 22  0032   WBC 14.0* 18.1* 13.2*   HGB 7.7* 8.2* 7.3*   HCT 23.7* 25.0* 22.4*   MCV 94.3 93.5 93.1   PLT 60* 45* 44*     Lab Results   Component Value Date    TROPONINI 0.53 2022     Lab Results   Component Value Date    .0 2013     Lab Results   Component Value Date    PROTIME 15.9 2022    PROTIME 14.4 2013    INR 1.28 2022    INR 1.14 2013     Lab Results   Component Value Date    CHOL 125 2013    HDL 41 2013    TRIG 77 2013        independently reviewed the ECG and telemetry.     Scheduled Meds:   cefepime 1,000 mg IntraVENous Q12H    vancomycin  1,250 mg IntraVENous Once    [Held by provider] atorvastatin  40 mg Oral Nightly    sodium chloride flush  5-40 mL IntraVENous 2 times per day    aspirin  300 mg Rectal Daily    vancomycin (VANCOCIN) intermittent dosing (placeholder)   Other RX Placeholder     Continuous Infusions:   sodium chloride      sodium chloride      dextrose 100 mL/hr (06/19/22 0814)    dextrose 100 mL/hr at 06/19/22 0402     PRN Meds:.sodium chloride flush, sodium chloride, ondansetron **OR** ondansetron, acetaminophen **OR** acetaminophen, polyethylene glycol, glucose, dextrose bolus **OR** dextrose bolus, glucagon (rDNA), dextrose, perflutren lipid microspheres     Assessment:   Patient Active Problem List    Diagnosis Date Noted    Chronic combined systolic and diastolic CHF (congestive heart failure) ([facility] 08/08/2013    Syncope 08/05/2013    Bradycardia 08/05/2013    Chronic kidney disease (CKD), stage III (moderate) ([facility] 02/20/2013    Dementia ([facility] 02/20/2013    STEMI (ST elevation myocardial infarction) ([facility] 02/19/2013      Active Hospital Problems    Diagnosis Date Noted    STEMI (ST elevation myocardial infarction) ([facility] [I21.3] 02/19/2013       Thank you for allowing me to participate in the care of Vinay Kowalski . If you have any questions/comments, please do not hesitate to contact us. Ravinder Bui MD   Cardiac Electrophysiology  [facility] [phone]    For any EP related issues after 5 PM, contact Era Caputo on call cardiology through .

## 2022-06-19 NOTE — PROGRESS NOTES
Late entry at 0900, pt having low blood sugars ranging 50-60s. Fingers cold therefore resulting possible false readings. D5W infusing per order. Lab glucose result at 0528 this am 128. Patient's fingers warmed with warm compress and heating pad. Blood sugars rechecked after fingers warmed and results showing 95. Patient continued to have blood sugars above 70 with warmed fingers  with one exception of a blood sugar 59 of which fingers were cold at that time. Blood sugar rechecked after fingers re warmed and the result was 113. Dr. Johnson Offer informed of above and DCd the D5W continuous fluids. Normal saline infusing per order at 75cc/hr and blood sugar stable at 123. Patient more alert and speech more clear. Patient now AO to person, place, but not sure on the month and year. Will continue to monitor.

## 2022-06-19 NOTE — PROGRESS NOTES
4 Eyes Admission Assessment     I agree as the admission nurse that 2 RN's have performed a thorough Head to Toe Skin Assessment on the patient. ALL assessment sites listed below have been assessed on admission. Areas assessed by both nurses:   Corinne, and jenny   [x]   Head, Face, and Ears   [x]   Shoulders, Back, and Chest  [x]   Arms, Elbows, and Hands   [x]   Coccyx, Sacrum, and Ischium  [x]   Legs, Feet, and Heels  Patient had scattered abrasions, and brusing. there were x3 small skin tears noted to LLE, x2 calf area, and and ankle. Does the Patient have Skin Breakdown? No         Justin Prevention initiated:  No   Wound Care Orders initiated:  No      [facility] nurse consulted for Pressure Injury (Stage 3,4, Unstageable, DTI, NWPT, and Complex wounds) or Justin score 18 or lower:  No      Nurse 1 eSignature: Electronically signed by Rakel Cha RN on 6/19/22 at 9:00 AM EDT    SHARE this note so that the co-signing nurse is able to place an eSignature. . signed by this nurse and accidentally signed twice by ASHLEY Mckeon RN.     Nurse 2 eSignature: Electronically signed by Rakel Cha RN on 6/19/22 at 9:02 AM EDT
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
## 2022-06-19 NOTE — PLAN OF CARE
Problem: Discharge Planning  Goal: Discharge to home or other facility with appropriate resources  Outcome: Progressing     Problem: Pain  Goal: Verbalizes/displays adequate comfort level or baseline comfort level  Outcome: Progressing  Flowsheets (Taken 6/18/2022 1945 by Rory Dancer, RN)  Verbalizes/displays adequate comfort level or baseline comfort level:   Encourage patient to monitor pain and request assistance   Assess pain using appropriate pain scale   Administer analgesics based on type and severity of pain and evaluate response   Implement non-pharmacological measures as appropriate and evaluate response     Problem: Safety - Adult  Goal: Free from fall injury  Outcome: Progressing     Problem: Neurosensory - Adult  Goal: Achieves stable or improved neurological status  Outcome: Progressing     Problem: Cardiovascular - Adult  Goal: Absence of cardiac dysrhythmias or at baseline  Outcome: Progressing     Problem: Respiratory - Adult  Goal: Achieves optimal ventilation and oxygenation  Outcome: Progressing     Problem: Genitourinary - Adult  Goal: Urinary catheter remains patent  Outcome: Progressing     Problem: Metabolic/Fluid and Electrolytes - Adult  Goal: Electrolytes maintained within normal limits  Outcome: Progressing     Problem: Skin/Tissue Integrity - Adult  Goal: Skin integrity remains intact  Outcome: Progressing     Problem: Musculoskeletal - Adult  Goal: Return mobility to safest level of function  Outcome: Progressing

## 2022-06-19 NOTE — PROGRESS NOTES
Pharmacy Note - Extended Infusion Beta-Lactam Adjustment    Cefepime ordered for treatment of Sepsis. Per [address] Extended Infusion Beta-Lactam Policy, Cefepime will be changed to 1000 mg IV EI q12h. Estimated Creatinine Clearance: Estimated Creatinine Clearance: 11 mL/min (A) (based on SCr of 2.8 mg/dL (H)). Dialysis Status, MIGUELITO, CKD: MIGUELITO - Baseline SCr ~1.5  BMI: Body mass index is 20.93 kg/m². Rationale for Adjustment: Agent is renally eliminated and demonstrates time-dependent effect on bacterial eradication. Extended-infusion dosing strategy aims to enhance microbiologic and clinical efficacy. Based on indication and renal function, will adjust dose as above. Pharmacy will continue to monitor renal function and adjust dose as necessary. Please call with any questions.     Idalia Reyna PharmD, [facility]  Wireless: [id number]   6/19/2022 7:33 AM

## 2022-06-19 NOTE — PLAN OF CARE
Problem: Discharge Planning  Goal: Discharge to home or other facility with appropriate resources  6/19/2022 0843 by Jasmeet Reeves RN  Outcome: Progressing  6/18/2022 2034 by Jas Valdez RN  Outcome: Progressing  Flowsheets (Taken 6/18/2022 1945 by Jasmeet Reeves RN)  Discharge to home or other facility with appropriate resources:   Identify barriers to discharge with patient and caregiver   Arrange for needed discharge resources and transportation as appropriate   Identify discharge learning needs (meds, wound care, etc)     Problem: Pain  Goal: Verbalizes/displays adequate comfort level or baseline comfort level  6/19/2022 0843 by Jasmeet Reeves RN  Outcome: Progressing  6/18/2022 2034 by Jas Valdez RN  Outcome: Progressing  Flowsheets (Taken 6/18/2022 1945 by Jasmeet Reeves RN)  Verbalizes/displays adequate comfort level or baseline comfort level:   Encourage patient to monitor pain and request assistance   Assess pain using appropriate pain scale   Administer analgesics based on type and severity of pain and evaluate response   Implement non-pharmacological measures as appropriate and evaluate response     Problem: Safety - Adult  Goal: Free from fall injury  6/19/2022 0843 by Jasmeet Reeves RN  Outcome: Progressing  Flowsheets (Taken 6/18/2022 2320)  Free From Fall Injury:   Instruct family/caregiver on patient safety   Based on caregiver fall risk screen, instruct family/caregiver to ask for assistance with transferring infant if caregiver noted to have fall risk factors  6/18/2022 2034 by Jas Valdez RN  Outcome: Progressing     Problem: Neurosensory - Adult  Goal: Achieves stable or improved neurological status  6/19/2022 0843 by Jasmeet Reeves RN  Outcome: Progressing  6/18/2022 2034 by Jas Valdez RN  Outcome: Progressing  Flowsheets (Taken 6/18/2022 1945 by Jasmeet Reeves RN)  Achieves stable or improved neurological status: Assess for and report changes in neurological status   Initiate measures to prevent increased intracranial pressure   Maintain blood pressure and fluid volume within ordered parameters to optimize cerebral perfusion and minimize risk of hemorrhage   Monitor temperature, glucose, and sodium.  Initiate appropriate interventions as ordered     Problem: Cardiovascular - Adult  Goal: Absence of cardiac dysrhythmias or at baseline  6/19/2022 0843 by Ezio Guerrero RN  Outcome: Progressing  6/18/2022 2034 by Winter Andino RN  Outcome: Progressing  Flowsheets (Taken 6/18/2022 1945 by Ezio Guerrero RN)  Absence of cardiac dysrhythmias or at baseline:   Monitor cardiac rate and rhythm   Assess for signs of decreased cardiac output     Problem: Respiratory - Adult  Goal: Achieves optimal ventilation and oxygenation  6/19/2022 0843 by Ezio Guerrero RN  Outcome: Progressing  6/18/2022 2034 by Winter Andino RN  Outcome: Progressing  Flowsheets (Taken 6/18/2022 1945 by Ezio Guerrero RN)  Achieves optimal ventilation and oxygenation:   Assess for changes in respiratory status   Assess for changes in mentation and behavior   Position to facilitate oxygenation and minimize respiratory effort   Oxygen supplementation based on oxygen saturation or arterial blood gases   Encourage broncho-pulmonary hygiene including cough, deep breathe, incentive spirometry   Assess the need for suctioning and aspirate as needed   Assess and instruct to report shortness of breath or any respiratory difficulty   Respiratory therapy support as indicated     Problem: Genitourinary - Adult  Goal: Urinary catheter remains patent  6/19/2022 0843 by Ezio Guerrero RN  Outcome: Progressing  6/18/2022 2034 by Winter Andino RN  Outcome: Progressing  Flowsheets (Taken 6/18/2022 1945 by Ezio Guerrero RN)  Urinary catheter remains patent: Assess patency of urinary catheter     Problem: Metabolic/Fluid and Electrolytes - Adult  Goal: Electrolytes maintained within normal limits  6/19/2022 0843 by Jeremy Faust RN  Outcome: Progressing  6/18/2022 2034 by Yamini Morataya RN  Outcome: Progressing  Flowsheets (Taken 6/18/2022 1945 by Jeremy Faust RN)  Electrolytes maintained within normal limits:   Monitor labs and assess patient for signs and symptoms of electrolyte imbalances   Administer electrolyte replacement as ordered   Monitor response to electrolyte replacements, including repeat lab results as appropriate   Fluid restriction as ordered   Instruct patient on fluid and nutrition restrictions as appropriate     Problem: Skin/Tissue Integrity - Adult  Goal: Skin integrity remains intact  6/19/2022 0843 by Jeremy Faust RN  Outcome: Progressing  Flowsheets (Taken 6/18/2022 2320)  Skin Integrity Remains Intact:   Monitor for areas of redness and/or skin breakdown   Assess vascular access sites hourly  6/18/2022 2034 by Yamini Morataya RN  Outcome: Progressing  Flowsheets (Taken 6/18/2022 1945 by Jeremy Faust RN)  Skin Integrity Remains Intact:   Monitor for areas of redness and/or skin breakdown   Assess vascular access sites hourly     Problem: Musculoskeletal - Adult  Goal: Return mobility to safest level of function  6/19/2022 0843 by Jeremy Faust RN  Outcome: Progressing  6/18/2022 2034 by Yamini Morataya RN  Outcome: Progressing  Flowsheets (Taken 6/18/2022 1945 by Jeremy Faust RN)  Return Mobility to Safest Level of Function:   Assess patient stability and activity tolerance for standing, transferring and ambulating with or without assistive devices   Assist with transfers and ambulation using safe patient handling equipment as needed   Ensure adequate protection for wounds/incisions during mobilization

## 2022-06-20 ENCOUNTER — APPOINTMENT (OUTPATIENT)
Dept: ULTRASOUND IMAGING | Age: 87
DRG: 682 | End: 2022-06-20
Payer: COMMERCIAL

## 2022-06-20 LAB
ABO/RH: NORMAL
ALBUMIN SERPL-MCNC: 3 G/DL (ref 3.4–5)
ALP BLD-CCNC: 60 U/L (ref 40–129)
ALT SERPL-CCNC: 581 U/L (ref 10–40)
ANION GAP SERPL CALCULATED.3IONS-SCNC: 11 MMOL/L (ref 3–16)
ANION GAP SERPL CALCULATED.3IONS-SCNC: 13 MMOL/L (ref 3–16)
ANTIBODY SCREEN: NORMAL
AST SERPL-CCNC: 550 U/L (ref 15–37)
BASOPHILS ABSOLUTE: 0 K/UL (ref 0–0.2)
BASOPHILS RELATIVE PERCENT: 0 %
BILIRUB SERPL-MCNC: 0.7 MG/DL (ref 0–1)
BILIRUBIN DIRECT: <0.2 MG/DL (ref 0–0.3)
BILIRUBIN, INDIRECT: ABNORMAL MG/DL (ref 0–1)
BLOOD BANK DISPENSE STATUS: NORMAL
BLOOD BANK PRODUCT CODE: NORMAL
BPU ID: NORMAL
BUN BLDV-MCNC: 41 MG/DL (ref 7–20)
BUN BLDV-MCNC: 45 MG/DL (ref 7–20)
CALCIUM SERPL-MCNC: 9.8 MG/DL (ref 8.3–10.6)
CALCIUM SERPL-MCNC: 9.9 MG/DL (ref 8.3–10.6)
CHLORIDE BLD-SCNC: 105 MMOL/L (ref 99–110)
CHLORIDE BLD-SCNC: 107 MMOL/L (ref 99–110)
CO2: 17 MMOL/L (ref 21–32)
CO2: 19 MMOL/L (ref 21–32)
CREAT SERPL-MCNC: 1.5 MG/DL (ref 0.6–1.2)
CREAT SERPL-MCNC: 2 MG/DL (ref 0.6–1.2)
DESCRIPTION BLOOD BANK: NORMAL
EKG ATRIAL RATE: 71 BPM
EKG DIAGNOSIS: NORMAL
EKG P AXIS: 62 DEGREES
EKG P-R INTERVAL: 122 MS
EKG Q-T INTERVAL: 406 MS
EKG QRS DURATION: 86 MS
EKG QTC CALCULATION (BAZETT): 441 MS
EKG R AXIS: 48 DEGREES
EKG T AXIS: 50 DEGREES
EKG VENTRICULAR RATE: 71 BPM
EOSINOPHILS ABSOLUTE: 0 K/UL (ref 0–0.6)
EOSINOPHILS RELATIVE PERCENT: 0.1 %
GFR AFRICAN AMERICAN: 29
GFR AFRICAN AMERICAN: 40
GFR NON-AFRICAN AMERICAN: 24
GFR NON-AFRICAN AMERICAN: 33
GLUCOSE BLD-MCNC: 104 MG/DL (ref 70–99)
GLUCOSE BLD-MCNC: 106 MG/DL (ref 70–99)
GLUCOSE BLD-MCNC: 17 MG/DL (ref 70–99)
GLUCOSE BLD-MCNC: 21 MG/DL (ref 70–99)
GLUCOSE BLD-MCNC: 26 MG/DL (ref 70–99)
GLUCOSE BLD-MCNC: 287 MG/DL (ref 70–99)
GLUCOSE BLD-MCNC: 34 MG/DL (ref 70–99)
GLUCOSE BLD-MCNC: 35 MG/DL (ref 70–99)
GLUCOSE BLD-MCNC: 41 MG/DL (ref 70–99)
GLUCOSE BLD-MCNC: 62 MG/DL (ref 70–99)
GLUCOSE BLD-MCNC: 78 MG/DL (ref 70–99)
GLUCOSE BLD-MCNC: 85 MG/DL (ref 70–99)
GLUCOSE BLD-MCNC: 98 MG/DL (ref 70–99)
GLUCOSE BLD-MCNC: 98 MG/DL (ref 70–99)
HCT VFR BLD CALC: 20.7 % (ref 36–48)
HCT VFR BLD CALC: 30.8 % (ref 36–48)
HEMOGLOBIN: 10.2 G/DL (ref 12–16)
HEMOGLOBIN: 6.7 G/DL (ref 12–16)
LACTIC ACID: 1.5 MMOL/L (ref 0.4–2)
LV EF: 63 %
LVEF MODALITY: NORMAL
LYMPHOCYTES ABSOLUTE: 0.6 K/UL (ref 1–5.1)
LYMPHOCYTES RELATIVE PERCENT: 4.8 %
MAGNESIUM: 2.5 MG/DL (ref 1.8–2.4)
MCH RBC QN AUTO: 30.2 PG (ref 26–34)
MCHC RBC AUTO-ENTMCNC: 32.3 G/DL (ref 31–36)
MCV RBC AUTO: 93.5 FL (ref 80–100)
MONOCYTES ABSOLUTE: 1 K/UL (ref 0–1.3)
MONOCYTES RELATIVE PERCENT: 8.1 %
MRSA SCREEN RT-PCR: NORMAL
NEUTROPHILS ABSOLUTE: 10.8 K/UL (ref 1.7–7.7)
NEUTROPHILS RELATIVE PERCENT: 87 %
ORGANISM: ABNORMAL
PDW BLD-RTO: 13 % (ref 12.4–15.4)
PERFORMED ON: ABNORMAL
PERFORMED ON: NORMAL
PLATELET # BLD: 39 K/UL (ref 135–450)
PMV BLD AUTO: 11.7 FL (ref 5–10.5)
POTASSIUM SERPL-SCNC: 4 MMOL/L (ref 3.5–5.1)
POTASSIUM SERPL-SCNC: 4.4 MMOL/L (ref 3.5–5.1)
PROCALCITONIN: 5.59 NG/ML (ref 0–0.15)
RBC # BLD: 2.22 M/UL (ref 4–5.2)
SODIUM BLD-SCNC: 135 MMOL/L (ref 136–145)
SODIUM BLD-SCNC: 137 MMOL/L (ref 136–145)
TOTAL PROTEIN: 6.3 G/DL (ref 6.4–8.2)
TROPONIN: 0.3 NG/ML
TROPONIN: 0.38 NG/ML
TROPONIN: 0.4 NG/ML
TSH REFLEX: 1.72 UIU/ML (ref 0.27–4.2)
URINE CULTURE, ROUTINE: ABNORMAL
VANCOMYCIN RANDOM: 13.5 UG/ML
WBC # BLD: 12.4 K/UL (ref 4–11)

## 2022-06-20 PROCEDURE — 99233 SBSQ HOSP IP/OBS HIGH 50: CPT | Performed by: INTERNAL MEDICINE

## 2022-06-20 PROCEDURE — 85018 HEMOGLOBIN: CPT

## 2022-06-20 PROCEDURE — 84443 ASSAY THYROID STIM HORMONE: CPT

## 2022-06-20 PROCEDURE — P9016 RBC LEUKOCYTES REDUCED: HCPCS

## 2022-06-20 PROCEDURE — 97535 SELF CARE MNGMENT TRAINING: CPT

## 2022-06-20 PROCEDURE — 85025 COMPLETE CBC W/AUTO DIFF WBC: CPT

## 2022-06-20 PROCEDURE — 80048 BASIC METABOLIC PNL TOTAL CA: CPT

## 2022-06-20 PROCEDURE — 92610 EVALUATE SWALLOWING FUNCTION: CPT

## 2022-06-20 PROCEDURE — 51702 INSERT TEMP BLADDER CATH: CPT

## 2022-06-20 PROCEDURE — 86923 COMPATIBILITY TEST ELECTRIC: CPT

## 2022-06-20 PROCEDURE — 2580000003 HC RX 258: Performed by: STUDENT IN AN ORGANIZED HEALTH CARE EDUCATION/TRAINING PROGRAM

## 2022-06-20 PROCEDURE — 83735 ASSAY OF MAGNESIUM: CPT

## 2022-06-20 PROCEDURE — 83605 ASSAY OF LACTIC ACID: CPT

## 2022-06-20 PROCEDURE — 93306 TTE W/DOPPLER COMPLETE: CPT

## 2022-06-20 PROCEDURE — 97167 OT EVAL HIGH COMPLEX 60 MIN: CPT

## 2022-06-20 PROCEDURE — 80076 HEPATIC FUNCTION PANEL: CPT

## 2022-06-20 PROCEDURE — 85014 HEMATOCRIT: CPT

## 2022-06-20 PROCEDURE — 36415 COLL VENOUS BLD VENIPUNCTURE: CPT

## 2022-06-20 PROCEDURE — 97530 THERAPEUTIC ACTIVITIES: CPT

## 2022-06-20 PROCEDURE — 86900 BLOOD TYPING SEROLOGIC ABO: CPT

## 2022-06-20 PROCEDURE — 99233 SBSQ HOSP IP/OBS HIGH 50: CPT | Performed by: NURSE PRACTITIONER

## 2022-06-20 PROCEDURE — 6360000002 HC RX W HCPCS: Performed by: STUDENT IN AN ORGANIZED HEALTH CARE EDUCATION/TRAINING PROGRAM

## 2022-06-20 PROCEDURE — 80202 ASSAY OF VANCOMYCIN: CPT

## 2022-06-20 PROCEDURE — 36430 TRANSFUSION BLD/BLD COMPNT: CPT

## 2022-06-20 PROCEDURE — 82306 VITAMIN D 25 HYDROXY: CPT

## 2022-06-20 PROCEDURE — 2060000000 HC ICU INTERMEDIATE R&B

## 2022-06-20 PROCEDURE — 84484 ASSAY OF TROPONIN QUANT: CPT

## 2022-06-20 PROCEDURE — 93010 ELECTROCARDIOGRAM REPORT: CPT | Performed by: INTERNAL MEDICINE

## 2022-06-20 PROCEDURE — 99221 1ST HOSP IP/OBS SF/LOW 40: CPT | Performed by: NURSE PRACTITIONER

## 2022-06-20 PROCEDURE — 76705 ECHO EXAM OF ABDOMEN: CPT

## 2022-06-20 PROCEDURE — 84145 PROCALCITONIN (PCT): CPT

## 2022-06-20 PROCEDURE — 86901 BLOOD TYPING SEROLOGIC RH(D): CPT

## 2022-06-20 PROCEDURE — 86850 RBC ANTIBODY SCREEN: CPT

## 2022-06-20 RX ORDER — LIDOCAINE HYDROCHLORIDE 10 MG/ML
5 INJECTION, SOLUTION EPIDURAL; INFILTRATION; INTRACAUDAL; PERINEURAL ONCE
Status: DISCONTINUED | OUTPATIENT
Start: 2022-06-20 | End: 2022-06-24 | Stop reason: HOSPADM

## 2022-06-20 RX ORDER — SODIUM CHLORIDE 0.9 % (FLUSH) 0.9 %
5-40 SYRINGE (ML) INJECTION PRN
Status: DISCONTINUED | OUTPATIENT
Start: 2022-06-20 | End: 2022-06-24 | Stop reason: HOSPADM

## 2022-06-20 RX ORDER — SODIUM CHLORIDE 9 MG/ML
INJECTION, SOLUTION INTRAVENOUS PRN
Status: DISCONTINUED | OUTPATIENT
Start: 2022-06-20 | End: 2022-06-24 | Stop reason: HOSPADM

## 2022-06-20 RX ORDER — SODIUM CHLORIDE 9 MG/ML
25 INJECTION, SOLUTION INTRAVENOUS PRN
Status: DISCONTINUED | OUTPATIENT
Start: 2022-06-20 | End: 2022-06-24 | Stop reason: HOSPADM

## 2022-06-20 RX ORDER — DEXTROSE AND SODIUM CHLORIDE 5; .45 G/100ML; G/100ML
INJECTION, SOLUTION INTRAVENOUS CONTINUOUS
Status: ACTIVE | OUTPATIENT
Start: 2022-06-20 | End: 2022-06-20

## 2022-06-20 RX ORDER — SODIUM CHLORIDE 9 MG/ML
INJECTION, SOLUTION INTRAVENOUS PRN
Status: DISCONTINUED | OUTPATIENT
Start: 2022-06-20 | End: 2022-06-20

## 2022-06-20 RX ORDER — SODIUM CHLORIDE 0.9 % (FLUSH) 0.9 %
5-40 SYRINGE (ML) INJECTION EVERY 12 HOURS SCHEDULED
Status: DISCONTINUED | OUTPATIENT
Start: 2022-06-20 | End: 2022-06-24 | Stop reason: HOSPADM

## 2022-06-20 RX ADMIN — DEXTROSE MONOHYDRATE 125 ML: 10 INJECTION, SOLUTION INTRAVENOUS at 08:14

## 2022-06-20 RX ADMIN — CEFEPIME HYDROCHLORIDE 1000 MG: 1 INJECTION, POWDER, FOR SOLUTION INTRAMUSCULAR; INTRAVENOUS at 19:47

## 2022-06-20 RX ADMIN — CEFEPIME HYDROCHLORIDE 1000 MG: 1 INJECTION, POWDER, FOR SOLUTION INTRAMUSCULAR; INTRAVENOUS at 08:36

## 2022-06-20 RX ADMIN — SODIUM CHLORIDE, PRESERVATIVE FREE 10 ML: 5 INJECTION INTRAVENOUS at 19:50

## 2022-06-20 RX ADMIN — DEXTROSE AND SODIUM CHLORIDE: 5; 450 INJECTION, SOLUTION INTRAVENOUS at 12:39

## 2022-06-20 RX ADMIN — DEXTROSE MONOHYDRATE 125 ML: 10 INJECTION, SOLUTION INTRAVENOUS at 09:21

## 2022-06-20 RX ADMIN — SODIUM CHLORIDE, PRESERVATIVE FREE 10 ML: 5 INJECTION INTRAVENOUS at 08:43

## 2022-06-20 ASSESSMENT — ENCOUNTER SYMPTOMS
GASTROINTESTINAL NEGATIVE: 1
RESPIRATORY NEGATIVE: 1

## 2022-06-20 ASSESSMENT — PAIN SCALES - GENERAL
PAINLEVEL_OUTOF10: 0

## 2022-06-20 NOTE — PLAN OF CARE
SLP completed evaluation, please refer to note in [facility], [place], [place], [address]

## 2022-06-20 NOTE — CONSULTS
[facility]  Palliative Medicine Consultation Note      Date Of Admission:6/18/2022  Date of consult: 06/20/22  Seen by [facility] in the past:  No    Recommendations:        Pt is sleepy but awakens easily and is oriented x3 and following commands. She appears to be close to her baseline. I called her daughter Georges Bergeron, who is her only child and next of kin. Introduced palliative care and had a voluntary discussion about advance care planning. Discussed code status including potential risks/burdens of CPR, and Georges Bergeron agrees to consider it and talk with her mother about her wishes. Briefly discussed SNF and Georges Bergeron prefers SNF. D/w CM Yash. 1. Goals of Care/Advanced Care planning/Code status: Full Code, discussed with dtr Georges Bergeron today. She hopes pt can go to a SNF and then return home. 2. Pain: pt denies pain/discomfort. 3. SOB: denies sob. Breathing comfortably on 3 L oxygen. Being treated for pneumonia. 4. Weakness: dtr reports pt is mostly independent at home but is very slow in completing ADLs, so dtr assists her sometimes. PT/OT eval pending. 5. Disposition: will likely need SNF    Reason for Consult:         [x]  Goals of Care  [x]  Code Status Discussion/Advanced Care Planning   [x]  Psychosocial/Family Support  []  Symptom Management  []  Other (Specify)    Requesting Physician: Dr. Joe Victoria:  AMS    History Obtained From:  family member - daughter Georges Bergeron, electronic medical record    History of Present Illness:         Roseann Martin is a 82 y.o. female with PMH of dementia, HLD, CAD, CKD, HFrEF EF 45-50% who presented with altered mental status. She was slumped over in her chair at home and was brought to the ED. She was admitted with STEMI, MIGUELITO on CKD likely secondary to dehydration, and possible sepsis. CT chest showed some bilateral lung consolidation concerning for pneumonia.      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 No pertinent surgical history. Current Medications:    Medications Prior to Admission: hydroCHLOROthiazide (MICROZIDE) 12.5 MG capsule, Take 12.5 mg by mouth 2 times daily  Ascorbic Acid (VITAMIN C) 250 MG tablet, Take 250 mg by mouth daily  vitamin D3 (CHOLECALCIFEROL) 10 MCG (400 UNIT) TABS tablet, Take 400 Units by mouth daily    Allergies:  Patient has no known allergies. Social History:    · TOBACCO: has an unknown smoking status. She has never used smokeless tobacco.  · ETOH:   reports no history of alcohol use. · Patient currently lives with family - daughter and sister    Review of Systems - obtained from pt and dtr  Review of Systems   Constitutional: Positive for fatigue. HENT: Negative. Respiratory: Negative. Cardiovascular: Negative. Gastrointestinal: Negative. Genitourinary: Negative. Musculoskeletal: Negative. Neurological: Positive for weakness. Lethargy   Psychiatric/Behavioral:        Short term memory loss consistent with early dementia   :     Objective:        Physical Exam  Constitutional:       General: She is not in acute distress. HENT:      Head: Normocephalic and atraumatic. Cardiovascular:      Rate and Rhythm: Normal rate and regular rhythm. Pulmonary:      Effort: Pulmonary effort is normal.      Breath sounds: Normal breath sounds. Abdominal:      General: Bowel sounds are normal.      Palpations: Abdomen is soft. Musculoskeletal:         General: No swelling. Skin:     General: Skin is warm and dry. Neurological:      Mental Status: She is oriented to person, place, and time.           Palliative Performance Scale:  [] 60% Ambulation reduced; Significant disease; Can't do hobbies/housework; intake normal or reduced; occasional assist; LOC full/confusion  [x] 50% Mainly sit/lie; Extensive disease; Can't do any work; Considerable assist; intake normal  Or reduced; LOC full/confusion  [] 40% Mainly in bed; Extensive disease; Mainly assist; intake normal or reduced; occasional assist; LOC full/confusion  [] 30% Bed Bound; Extensive disease; Total care; intake reduced; LOC full/confusion  [] 20% Bed Bound; Extensive disease; Total care; intake minimal; Drowsy/coma  [] 10% Bed Bound; Extensive disease; Total care; Mouth care only; Drowsy/coma  [] 0% Death      Vitals:    /60   Pulse 66   Temp 97.7 °F (36.5 °C) (Axillary)   Resp 16   Ht 5' (1.524 m)   Wt 105 lb (47.6 kg)   SpO2 98%   BMI 20.51 kg/m²     Labs:    BMP:   Recent Labs     06/19/22  0032 06/19/22  0032 06/19/22  0528 06/19/22  1252 06/20/22  0337     --  138  --  137   K 4.1  --  4.4  --  4.0     --  105  --  107   CO2 17*  --  18*  --  19*   BUN 57*  --  55*  --  45*   CREATININE 2.9*  --  2.8*  --  2.0*   GLUCOSE 145*   < > 128* 162* 106*    < > = values in this interval not displayed. CBC:   Recent Labs     06/19/22  0032 06/19/22  1252 06/20/22  0337   WBC 13.2* 12.2* 12.4*   HGB 7.3* 7.3* 6.7*   HCT 22.4* 22.4* 20.7*   PLT 44* 41* 39*       LFT's:   Recent Labs     06/18/22  1345 06/19/22  0528 06/20/22  0337   * 529* 550*   * 389* 581*   BILITOT 0.8 0.6 0.7   ALKPHOS 54 51 60     Troponin:   Recent Labs     06/19/22  1252 06/19/22  1932 06/20/22  0337   TROPONINI 0.50* 0.46* 0.40*  0.38*     BNP: No results for input(s): BNP in the last 72 hours. ABGs: No results for input(s): PHART, BVE1RJC, PO2ART in the last 72 hours.   INR:   Recent Labs     06/18/22  1136   INR 1.28*       U/A:  Recent Labs     06/18/22  1345 06/18/22  1345 06/19/22  1745   COLORU Yellow  --   --    PHUR 5.5   < > 3.0   WBCUA 10-20*  --   --    RBCUA 3-4  --   --    BACTERIA 4+*  --   --    CLARITYU SL CLOUDY*  --   --    SPECGRAV >=1.030  --   -- LEUKOCYTESUR Negative  --   --    UROBILINOGEN 0.2  --   --    BILIRUBINUR Negative  --   --    BLOODU LARGE*  --   --    GLUCOSEU Negative  --   --     < > = values in this interval not displayed. MRI BRAIN WO CONTRAST   Final Result      1. No evidence of early ischemic injury. 2. Mild cortical atrophy. 3. Moderately severe periventricular white matter disease. CT CHEST WO CONTRAST   Final Result      1. Bilateral lower lobe airspace disease, moderate on the right and mild on left. CT ABDOMEN PELVIS WO CONTRAST Additional Contrast? None   Final Result      No acute abdominopelvic abnormality. INCIDENTAL FINDINGS:      XR CHEST PORTABLE   Final Result      No acute cardiopulmonary findings. CT HEAD WO CONTRAST   Final Result      Cerebral atrophy. Evidence of diffuse chronic small vessel white matter disease bilaterally. No acute intracranial findings. US GALLBLADDER RUQ    (Results Pending)         Conclusion/Time spent:         Recommendations see above    Pt [id number], Family [phone]  Time spent with patient and/or family: 25 min  Time reviewing records: 10 min   Time communicating with staff: 5 min     A total of 40 minutes spent with the patient and family on unit greater than 50% in counseling regarding palliative care and in goals of care for the patient. Thank you to Dr. Ad Nash for this consultation. We will continue to follow Ms. Yu's care as needed.     Fe Zavala, GONSALO  [address]

## 2022-06-20 NOTE — CONSULTS
Nephrology Consult Note                                                                                                                                                                                                                                                                                                                                                               Office : [phone]     Fax :[phone]              Patient's Name: Gustavo Valverde  6/19/2022    Reason for Consult: MIGUELITO   Requesting Physician:  Camille Litten, APRN - CNP      Chief Complaint:  AMS     History of Present Ilness:    Gustavo Valverde is a 82 y.o. past medical history significant for CKD, hypertension, hyperlipidemia, cardiomyopathy with LV ejection fraction of 45 to 50%, severe LVH, grade 3 diastolic dysfunction, no major obstructive CAD in 2013, was brought into the hospital via EMS secondary to altered mental status. History is limited from the patient. 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 No pertinent surgical history. History reviewed. No pertinent family history. has an unknown smoking status. She has never used smokeless tobacco. She reports that she does not drink alcohol and does not use drugs. Allergies:  Patient has no known allergies.     Current Medications:    cefepime (MAXIPIME) 1000 mg IVPB minibag, Q12H  0.9 % sodium chloride infusion, Continuous  sodium chloride flush 0.9 % injection 5-40 mL, 2 times per day  sodium chloride flush 0.9 % injection 5-40 mL, PRN  0.9 % sodium chloride infusion, PRN  ondansetron (ZOFRAN-ODT) disintegrating tablet 4 mg, Q8H PRN   Or  ondansetron (ZOFRAN) injection 4 mg, Q6H PRN  acetaminophen (TYLENOL) tablet 650 mg, Q6H PRN   Or  acetaminophen (TYLENOL) suppository 650 mg, Q6H PRN  polyethylene glycol (GLYCOLAX) packet 17 g, Daily PRN  glucose chewable tablet 16 g, PRN  dextrose bolus 10% 125 mL, PRN   Or  dextrose bolus 10% 250 mL, PRN  glucagon (rDNA) injection 1 mg, PRN  dextrose 5 % solution, PRN  vancomycin (VANCOCIN) intermittent dosing (placeholder), RX Placeholder  perflutren lipid microspheres (DEFINITY) injection 1.65 mg, ONCE PRN        Review of Systems:   14 point ROS obtained but were negative except mentioned in HPI      Physical exam:     Vitals:  /67   Pulse 71   Temp 98.1 °F (36.7 °C) (Axillary)   Resp 14   Ht 5' (1.524 m)   Wt 107 lb 2.3 oz (48.6 kg)   SpO2 100%   BMI 20.93 kg/m²   Constitutional:  OAA X3 NAD  Skin: no rash, turgor wnl  Heent:  eomi, mmm  Neck: no bruits or jvd noted  Cardiovascular:  S1, S2 without m/r/g  Respiratory: CTA B without w/r/r  Abdomen:  +bs, soft, nt, nd  Ext: + lower extremity edema  Psychiatric: mood and affect appropriate  Musculoskeletal:  Rom, muscular strength intact    Data:   Labs:  CBC:   Recent Labs     06/18/22  1408 06/19/22  0032 06/19/22  1252   WBC 18.1* 13.2* 12.2*   HGB 8.2* 7.3* 7.3*   PLT 45* 44* 41*     BMP:    Recent Labs     06/18/22  1135 06/18/22  1135 06/19/22  0032 06/19/22  0528 06/19/22  1252     --  137 138  --    K 4.2  --  4.1 4.4  --      --  105 105  --    CO2 19*  --  17* 18*  --    BUN 52*  --  57* 55*  --    CREATININE 3.3*  --  2.9* 2.8*  --    GLUCOSE 133*   < > 145* 128* 162*    < > = values in this interval not displayed.      Ca/Mg/Phos:   Recent Labs     06/18/22  1135 06/19/22  0032 06/19/22  0528   CALCIUM 10.3 9.5 9.2   MG  --  1.50* 1.90     Hepatic:   Recent Labs     06/18/22  1345 06/19/22  0528   * 529*   * 389*   BILITOT 0.8 0.6   ALKPHOS 54 51     Troponin:   Recent Labs     06/19/22  0528 06/19/22  1252 06/19/22  1932   TROPONINI 0.53* 0.50* 0.46*     BNP: No results for input(s): BNP in the last 72 hours. Lipids:   Recent Labs     06/19/22  0528   CHOL 125   TRIG 103   HDL 41   LDLCALC 63   LABVLDL 21     ABGs: No results for input(s): PHART, PO2ART, TYU0IYI in the last 72 hours. INR:   Recent Labs     06/18/22  1136   INR 1.28*     UA:  Recent Labs     06/18/22  1345 06/18/22  1345 06/19/22  1745   COLORU Yellow  --   --    CLARITYU SL CLOUDY*  --   --    GLUCOSEU Negative  --   --    BILIRUBINUR Negative  --   --    KETUA Negative  --   --    SPECGRAV >=1.030  --   --    BLOODU LARGE*  --   --    PHUR 5.5   < > 3.0   PROTEINU 100*  --   --    UROBILINOGEN 0.2  --   --    NITRU Negative  --   --    LEUKOCYTESUR Negative  --   --    LABMICR YES  --   --    URINETYPE NotGiven  --   --     < > = values in this interval not displayed. Urine Microscopic:   Recent Labs     06/18/22  1345   BACTERIA 4+*   WBCUA 10-20*   RBCUA 3-4     Urine Culture:   Recent Labs     06/18/22  1345   LABURIN >100,000 CFU/ml  Sensitivity to follow       Urine Chemistry:   Recent Labs     06/19/22  1745   LABCREA 55.4  55.5   PROTEINUR 28.00*   NAUR 40             IMAGING:  MRI BRAIN WO CONTRAST   Final Result      1. No evidence of early ischemic injury. 2. Mild cortical atrophy. 3. Moderately severe periventricular white matter disease. CT CHEST WO CONTRAST   Final Result      1. Bilateral lower lobe airspace disease, moderate on the right and mild on left. CT ABDOMEN PELVIS WO CONTRAST Additional Contrast? None   Final Result      No acute abdominopelvic abnormality. INCIDENTAL FINDINGS:      XR CHEST PORTABLE   Final Result      No acute cardiopulmonary findings. CT HEAD WO CONTRAST   Final Result      Cerebral atrophy. Evidence of diffuse chronic small vessel white matter disease bilaterally. No acute intracranial findings. US GALLBLADDER RUQ    (Results Pending)       Assessment/Plan   1. MIGUELITO     2. HTN    3.  Anemia    4. Acid- base/ Electrolyte imbalance 5.  AMS       Plan   - Ur studies  - IVF   - stop diuretics   - Monitor UO and renal function   - ECHO     Recommend to dose adjust all medications  based on renal functions  Maintain SBP> 90 mmHg   Daily weights   AVOID NSAIDs  Avoid Nephrotoxins  Monitor Intake/Output  Call if significant decrease in urine output             Thank you for allowing us to participate in care of Belkis Kibmle MD  Feel free to contact me   [facility] [address]  Office : [phone]  Fax :[phone]

## 2022-06-20 NOTE — PROGRESS NOTES
[facility]   Cardiology  Note   Dr Ada Uriarte MD, Zhane Moon RN, FNP APRN CVNP  Date: 6/20/2022  Admit Date: 6/18/2022       CC:AMS / possible sepsis   Troponin improved (0.70 > 0.57) flat   No STEMI / Early repolarization syndrome, benign form    Interval Hx /  Subjective: Today, she is resting in bed / VSS   PT/OT following No major events overnight. Dr Raciel Concepcion following   Troponin improved (0.70 > 0.57) flat   TTE pending   Glucose 35  replace   Hgb 6.7 was 7.3  6/19/2022 recommend PRBC transfusion   WBC 12.4 stable  Platelets 35 table     Patient seen and examined. Clinical notes reviewed. 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    CAD (coronary artery disease)     CKD (chronic kidney disease)     baseline ~1.5    MI (myocardial infarction) ([facility] [address]     Systolic CHF ([facility] [address]     EF 45%      Physical Examination:  Vitals:    06/20/22 1141   BP: 118/71   Pulse: 79   Resp: 18   Temp: 98 °F (36.7 °C)   SpO2: 98%      In: 450 [Other:450]  Out: 2500    Wt Readings from Last 3 Encounters:   06/20/22 105 lb (47.6 kg)       Intake/Output Summary (Last 24 hours) at 6/20/2022 1200  Last data filed at 6/20/2022 1103  Gross per 24 hour   Intake 450 ml   Output 1850 ml   Net -1400 ml       Telemetry: Personally Reviewed    · Constitutional: Cooperative and in no apparent distress, and appears well nourished  · Skin: Warm and pink; no pallor, cyanosis, clubbing, or bruising   · HEENT: Symmetric and normocephalic. · Cardiovascular: Regular rate and rhythm. S1/S2  · Respiratory: Respirations symmetric and unlabored.  Lungs clear to auscultation bilaterally, no wheezing, crackles, or rhonchi  · Gastrointestinal: Abdomen soft and round. Bowel sounds normoactive without tenderness or masses. · Musculoskeletal: Bilateral upper and lower extremity strength 5/5 with full ROM  · Neurologic/Psych: Awake and orientated to person, place and time. Calm affect, appropriate mood      Patient Active Problem List    Diagnosis Date Noted    Altered mental status     [facility]EL[facility]O [facility]acute kidney [facility]) ([facility] [address]     Electrolyte imbalance     Chronic combined systolic and diastolic CHF (congestive heart failure) ([facility] [address] 08/08/2013    Syncope 08/05/2013    Bradycardia 08/05/2013    Chronic kidney disease (CKD), stage III (moderate) ([facility] [address] 02/20/2013    Dementia ([facility] [address] 02/20/2013    NSTEMI (non-ST elevated myocardial infarction) ([facility] [address] 02/19/2013        Assessment     AMS   Leukocytosis / sepsis? UTI  with blood 10-20 WBC, 4+ bacteria, starte ceftriaxone 1g daily for 3 days    Troponin improved (0.70 > 0.57) flat   Not STEMI  early repolarization syndrome, benign form    ASH / CRI 2/2 dehydration   Dr Antonieta Taylor following     Thrombocytopenia  Follow      Anemia  IM following   Hgb 6.7 was 7.3  6/19/2022 recommend PRBC transfusion    low glucose  Managed per IM     Plan:   TTE pending   meds maxipime     Thank you for allowing to us to participate in the care of Vinay Chang APRN-CNP-CVNP    [address]   Patient seen in her room at the bedside. Not arousable for me at this time. Does not appear to be septic and there is no fever. 4+ bacteria in the urine and is on antibiotic therapy. Cardiac status looks fairly stable with a rhythm sinus 77 occasional PACs. Echocardiogram is pending. We will follow acutely.   Zehra Templeton MD, [facility]

## 2022-06-20 NOTE — CONSULTS
Nephrology Progress Note                                                                                                                                                                                                                                                                                                                                                               Office : [phone]     Fax :[phone]              Patient's Name: Radha Ventura  6/20/2022    History of Present Ilness:    Radha Ventura is a 82 y.o. past medical history significant for CKD, hypertension, hyperlipidemia, cardiomyopathy with LV ejection fraction of 45 to 50%, severe LVH, grade 3 diastolic dysfunction, no major obstructive CAD in 2013, was brought into the hospital via EMS secondary to altered mental status. History is limited from the patient. Cr elevated     Interval History: NAONE. Patient alert to person but not place, time or situation. 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 No pertinent surgical history. History reviewed. No pertinent family history. has an unknown smoking status. She has never used smokeless tobacco. She reports that she does not drink alcohol and does not use drugs. Allergies:  Patient has no known allergies.     Current Medications:    0.9 % sodium chloride infusion, PRN  cefepime (MAXIPIME) 1000 mg IVPB minibag, Q12H  sodium chloride flush 0.9 % injection 5-40 mL, 2 times per day  sodium chloride flush 0.9 % injection 5-40 mL, PRN  0.9 % sodium chloride infusion, PRN  ondansetron (ZOFRAN-ODT) disintegrating tablet 4 mg, Q8H PRN   Or  ondansetron (ZOFRAN) injection 4 mg, Q6H PRN  acetaminophen (TYLENOL) tablet 650 mg, Q6H PRN   Or  acetaminophen (TYLENOL) suppository 650 mg, Q6H PRN  polyethylene glycol (GLYCOLAX) packet 17 g, Daily PRN  glucose chewable tablet 16 g, PRN  dextrose bolus 10% 125 mL, PRN   Or  dextrose bolus 10% 250 mL, PRN  glucagon (rDNA) injection 1 mg, PRN  dextrose 5 % solution, PRN  perflutren lipid microspheres (DEFINITY) injection 1.65 mg, ONCE PRN        Review of Systems:   14 point ROS obtained but were negative except mentioned in HPI      Physical exam:     Vitals:  /60   Pulse 66   Temp 97.7 °F (36.5 °C) (Axillary)   Resp 16   Ht 5' (1.524 m)   Wt 105 lb (47.6 kg)   SpO2 98%   BMI 20.51 kg/m²   Constitutional:  OAA X3 NAD  Skin: no rash, turgor wnl  Heent:  eomi, mmm  Neck: no bruits or jvd noted  Cardiovascular:  S1, S2 without m/r/g  Respiratory: CTA B without w/r/r  Abdomen:  +bs, soft, nt, nd  Ext: + lower extremity edema  Psychiatric: mood and affect appropriate  Musculoskeletal:  Rom, muscular strength intact    Data:   Labs:  CBC:   Recent Labs     06/19/22  0032 06/19/22  1252 06/20/22  0337   WBC 13.2* 12.2* 12.4*   HGB 7.3* 7.3* 6.7*   PLT 44* 41* 39*     BMP:    Recent Labs     06/18/22  1135 06/18/22  1135 06/19/22  0032 06/19/22  0528 06/19/22  1252     --  137 138  --    K 4.2  --  4.1 4.4  --      --  105 105  --    CO2 19*  --  17* 18*  --    BUN 52*  --  57* 55*  --    CREATININE 3.3*  --  2.9* 2.8*  --    GLUCOSE 133*   < > 145* 128* 162*    < > = values in this interval not displayed. Ca/Mg/Phos:   Recent Labs     06/18/22  1135 06/19/22  0032 06/19/22  0528   CALCIUM 10.3 9.5 9.2   MG  --  1.50* 1.90     Hepatic:   Recent Labs     06/18/22  1345 06/19/22  0528   * 529*   * 389*   BILITOT 0.8 0.6   ALKPHOS 54 51     Troponin:   Recent Labs     06/19/22  1252 06/19/22  1932 06/20/22  0337   TROPONINI 0.50* 0.46* 0.38*     BNP: No results for input(s): BNP in the last 72 hours.   Lipids:   Recent Labs     06/19/22  0528   CHOL 125   TRIG 103   HDL 41   LDLCALC 63   LABVLDL 21 ABGs: No results for input(s): PHART, PO2ART, EGR5ZSX in the last 72 hours. INR:   Recent Labs     06/18/22  1136   INR 1.28*     UA:  Recent Labs     06/18/22  1345 06/18/22  1345 06/19/22  1745   COLORU Yellow  --   --    CLARITYU SL CLOUDY*  --   --    GLUCOSEU Negative  --   --    BILIRUBINUR Negative  --   --    KETUA Negative  --   --    SPECGRAV >=1.030  --   --    BLOODU LARGE*  --   --    PHUR 5.5   < > 3.0   PROTEINU 100*  --   --    UROBILINOGEN 0.2  --   --    NITRU Negative  --   --    LEUKOCYTESUR Negative  --   --    LABMICR YES  --   --    URINETYPE NotGiven  --   --     < > = values in this interval not displayed. Urine Microscopic:   Recent Labs     06/18/22  1345   BACTERIA 4+*   WBCUA 10-20*   RBCUA 3-4     Urine Culture:   Recent Labs     06/18/22  1345   LABURIN >100,000 CFU/ml     Urine Chemistry:   Recent Labs     06/19/22  1745   LABCREA 55.4  55.5   PROTEINUR 28.00*   NAUR 40             IMAGING:  MRI BRAIN WO CONTRAST   Final Result      1. No evidence of early ischemic injury. 2. Mild cortical atrophy. 3. Moderately severe periventricular white matter disease. CT CHEST WO CONTRAST   Final Result      1. Bilateral lower lobe airspace disease, moderate on the right and mild on left. CT ABDOMEN PELVIS WO CONTRAST Additional Contrast? None   Final Result      No acute abdominopelvic abnormality. INCIDENTAL FINDINGS:      XR CHEST PORTABLE   Final Result      No acute cardiopulmonary findings. CT HEAD WO CONTRAST   Final Result      Cerebral atrophy. Evidence of diffuse chronic small vessel white matter disease bilaterally. No acute intracranial findings. US GALLBLADDER RUQ    (Results Pending)       Assessment/Plan   1. MIGUELITO     2. HTN    3. Anemia    4. Acid- base/ Electrolyte imbalance     5.  AMS       Plan   - continue IVF   - stop diuretics   - Monitor UO and renal function   - ECHO pending  - Urine Protein/Creat ratio

## 2022-06-20 NOTE — PROGRESS NOTES
Progress Note    Admit Date: 6/18/2022  Day: 8  Diet: Diet NPO Exceptions are: Ice Chips    CC: AMS    Interval history: NAEO, Hgb 6.7, 1u pRBCs ordered. Pt is awakens. She is alert to per Person and time but not place or situation. HPI: Ben nelson 83 yo F with PMH of demenita, HLD, CAD, CKD, HFrEF Who presents from home. For AMS, pt sitting at home was slumped over in her chair and brought to the ED. Pt was altered on arrival, non-verbal but opens eyes and responds to pain.     In the ED EKG showed ST elevation in lateral and inferior leads code STEMI activated but pt did not get any intervention due to being altered. Pt afebrile, HDS. Pro-BNP elevated 7360, Troponin elevated 0.70, MIGUELITO 3.3. WBC 14.0, Hgb 7.7, platelet 60, pT 24.6, INR 1.28, aPTT 20.7. U/A cloudy, specific gravity >1.030, Large blood, protein, 10-20 WBC 3+ bacteria. CTH negative for any intracranial abnormality. CXR No acute cardiopulmonary findings.     Pt admitted for further work-up of AMS.      Medications:     Scheduled Meds:   cefepime  1,000 mg IntraVENous Q12H    sodium chloride flush  5-40 mL IntraVENous 2 times per day     Continuous Infusions:   sodium chloride      sodium chloride      dextrose 100 mL/hr (06/19/22 0814)     PRN Meds:sodium chloride, sodium chloride flush, sodium chloride, ondansetron **OR** ondansetron, acetaminophen **OR** acetaminophen, polyethylene glycol, glucose, dextrose bolus **OR** dextrose bolus, glucagon (rDNA), dextrose, perflutren lipid microspheres    Objective:   Vitals:   T-max:  Patient Vitals for the past 8 hrs:   BP Temp Temp src Pulse Resp SpO2 Weight   06/20/22 0819 116/60 97.7 °F (36.5 °C) Axillary 66 16 98 % --   06/20/22 0640 -- -- -- 60 -- -- --   06/20/22 0600 -- -- -- -- -- -- 105 lb (47.6 kg)   06/20/22 0441 114/65 97.6 °F (36.4 °C) Axillary 69 16 93 % --       Intake/Output Summary (Last 24 hours) at 6/20/2022 1038  Last data filed at 6/20/2022 0452  Gross per 24 hour   Intake 450 ml   Output 1550 ml   Net -1100 ml       Review of Systems    Physical Exam  Constitutional:       Appearance: She is ill-appearing. Cardiovascular:      Rate and Rhythm: Normal rate. Pulses: Normal pulses. Heart sounds: Normal heart sounds. Pulmonary:      Effort: Pulmonary effort is normal.   Abdominal:      General: Abdomen is flat. Palpations: Abdomen is soft. Musculoskeletal:         General: Normal range of motion. Skin:     General: Skin is warm and dry. Neurological:      Mental Status: She is disoriented. LABS:    CBC:   Recent Labs     06/19/22  0032 06/19/22  1252 06/20/22  0337   WBC 13.2* 12.2* 12.4*   HGB 7.3* 7.3* 6.7*   HCT 22.4* 22.4* 20.7*   PLT 44* 41* 39*   MCV 93.1 92.4 93.5     Renal:    Recent Labs     06/18/22  1135 06/18/22  1135 06/19/22  0032 06/19/22  0528 06/19/22  1252     --  137 138  --    K 4.2  --  4.1 4.4  --      --  105 105  --    CO2 19*  --  17* 18*  --    BUN 52*  --  57* 55*  --    CREATININE 3.3*  --  2.9* 2.8*  --    GLUCOSE 133*   < > 145* 128* 162*   CALCIUM 10.3  --  9.5 9.2  --    MG  --   --  1.50* 1.90  --    ANIONGAP 13  --  15 15  --     < > = values in this interval not displayed. Hepatic:   Recent Labs     06/18/22  1345 06/19/22  0528   * 529*   * 389*   BILITOT 0.8 0.6   BILIDIR <0.2 <0.2   PROT 6.7 6.0*   LABALBU 3.6 3.1*   ALKPHOS 54 51     Troponin:   Recent Labs     06/19/22  1252 06/19/22  1932 06/20/22  0337   TROPONINI 0.50* 0.46* 0.38*     BNP: No results for input(s): BNP in the last 72 hours. Lipids:   Recent Labs     06/19/22  0528   CHOL 125   HDL 41     ABGs:  No results for input(s): PHART, ZLT0VHT, PO2ART, FUS1JQE, BEART, THGBART, N7EYTVLR, SNF1PZE in the last 72 hours. INR:   Recent Labs     06/18/22  1136   INR 1.28*     Lactate: No results for input(s): LACTATE in the last 72 hours.   Cultures:  -----------------------------------------------------------------  RAD:   MRI BRAIN WO CONTRAST   Final Result      1. No evidence of early ischemic injury. 2. Mild cortical atrophy. 3. Moderately severe periventricular white matter disease. CT CHEST WO CONTRAST   Final Result      1. Bilateral lower lobe airspace disease, moderate on the right and mild on left. CT ABDOMEN PELVIS WO CONTRAST Additional Contrast? None   Final Result      No acute abdominopelvic abnormality. INCIDENTAL FINDINGS:      XR CHEST PORTABLE   Final Result      No acute cardiopulmonary findings. CT HEAD WO CONTRAST   Final Result      Cerebral atrophy. Evidence of diffuse chronic small vessel white matter disease bilaterally. No acute intracranial findings. US GALLBLADDER RUQ    (Results Pending)       Assessment/Plan:     Acute Metabolic Encephalopathy-improving  Leukocytosis-improving  Initially was only responsive to pain. Pt has baseline dementia  Likely secondary to infection, POA White count 14-->18, CTH no acute findings, CXR no acute findings, Procal 18.8. U/A with WBC 10-20 and 4+ bacteria. Empircally started on broad spectrum antibiotics of vanc and cefepime  - CTAP (6/18): no acute findings  - CT Chest (6/19): bilateral lower lobe airspace ds R>L  - MRI Brain (6/19): No evidence of early ischemic injury, mild cortical atrophy, mod severe periventricular white matter disease  - ammonia 34, WNL  - Blood cx NGTD  - Urine cx: E. coli   - MRSA swab negative, d/c vanc  - cont cefepime (6/18-) at least for one more day and see how pt improves  - palliative care consult    Hypoglycemia  POCT glucose are likely inaccurate, pt fingers are cold. - Per SLP eval, NPO with ice chips, crushed meds in applesauce  - D5, 1/2 /hr for 10 hrs    CK elevation  CK 6133  - fluids as above    MIGUELITO on CKD3a?-improving  Cr 3.3, POA.  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

## 2022-06-20 NOTE — PLAN OF CARE
Problem: Discharge Planning  Goal: Discharge to home or other facility with appropriate resources  Outcome: Progressing     Problem: Safety - Adult  Goal: Free from fall injury  6/20/2022 1501 by Chelsie Mae RN  Outcome: Progressing     Problem: Respiratory - Adult  Goal: Achieves optimal ventilation and oxygenation  6/20/2022 1501 by Chelsie Mae RN  Outcome: Progressing  Down to 2 liters from 3 liters N/C

## 2022-06-20 NOTE — PROGRESS NOTES
Speech Language Pathology  Facility/Department: [facility] [facility] [address] [facility]   CLINICAL BEDSIDE SWALLOW EVALUATION    NAME: Kayy Hawk  : 1935  MRN: [id number]    ADMISSION DATE: 2022  ADMITTING DIAGNOSIS: has NSTEMI (non-ST elevated myocardial infarction) ([facility] [address] Chronic kidney disease (CKD), stage III (moderate) ([facility] [address] Dementia ([facility] [address] Syncope; Bradycardia; Chronic combined systolic and diastolic CHF (congestive heart failure) ([facility] [address] Altered mental status; MIGUELITO (acute kidney injury) ([facility] [address] and Electrolyte imbalance on their problem list.  ONSET DATE: 22    Chest Xray 22:  Impression       No acute cardiopulmonary findings. CT of Chest: 22:  Impression       1. Bilateral lower lobe airspace disease, moderate on the right and mild on left. Brain MRI 22:  Impression       1. No evidence of early ischemic injury. 2. Mild cortical atrophy. 3. Moderately severe periventricular white matter disease. Date of Eval: 2022  Evaluating Therapist: MARK Lagos    Current Diet level:  Current Diet : NPO      Primary Complaint  Patient Complaint: Dislikes being repositioned    Pain:  Pain Assessment  Pain Assessment: None - Denies Pain  Pain Level: 0  Garcia-Baker Pain Rating: No hurt  Patient's Stated Pain Goal: 0 - No pain    Reason for Referral  Kayy Hawk was referred for a bedside swallow evaluation to assess the efficiency of her swallow function, identify signs and symptoms of aspiration and make recommendations regarding safe dietary consistencies, effective compensatory strategies, and safe eating environment. Impression  Dysphagia Diagnosis: Mild to moderate pharyngeal stage dysphagia; Moderate to severe oral stage dysphagia    Dysphagia Impression : Pt presents with high aspiration risk at this time secondary to mental status; She demonstrated delayed swallow/ oral bolus holding despite cueing to swallow. Delayed coughing noted at conclusion of trials. Recommend NPO at this time with ice chips after oral care; If pt needs PO medications, they may be crushed with puree (RN supervision and cues to ensure swallow). SLP to follow and determine safety of diet initiation as AMS resolves. Dysphagia Outcome Severity Scale: Level 1: Severe dysphagia- NPO. Unable to tolerate any PO safely     Treatment Plan  Requires SLP Intervention: Yes  Duration of Treatment: LOS until AMS resolves  D/C Recommendations: Ongoing speech therapy is recommended during this hospitalization       Recommended Diet and Intervention   Recommended Form of Meds: Crushed in puree as able  Recommendations: NPO;Dysphagia treatment  Therapeutic Interventions: Oral care; Patient/Family education    Compensatory Swallowing Strategies  Compensatory Swallowing Strategies : Total feed    Treatment/Goals  Short-term Goals  Goal 1: Pt will participate in dynamic swallow assessment  Goal 2: Pt will initiate oral diet when indicated safe by SLP  Goal 3: Pt will demonstrate understanding of recommendations from SLP    General  Chart Reviewed: Yes  Comments: Pt is an 82year old female with PMHx of HLD, CAD, CKD, HF, dementia. She is admitted for AMS, STEMI and MIGUELITO. Subjective  Subjective: RN cleared pt for swallow assessment and was present during evaluation. Pt is noted to be confused, breathing room air but participates in eval with cueing. Behavior/Cognition: Alert;Confused  Respiratory Status: Room air  O2 Device: None (Room air)  Communication Observation: Functional  Follows Directions: Simple (requires repetition and cueing)  Dentition: Dentures top;Dentures bottom  Patient Positioning: Upright in bed  Baseline Vocal Quality: Hoarse;Dysphonic  Volitional Cough: Weak  Prior Dysphagia History: 2013: SLP recommendations for soft diet and thin liquids  Consistencies Administered: Thin - cup;Pureed; Ice Chips    Vision/Hearing  Vision  Vision: Impaired  Vision Exceptions:  (difficulty tracking SLP/objects)  Hearing  Hearing: Exceptions to [facility]  Hearing Exceptions: Hard of hearing/hearing concerns    Oral Motor Deficits   Pt with difficulty following commands for oral motor exam; Significantly reduced lingual range of motion/ strength, reduced labial range of motion/strength. Voice is hoarse, rough and tremulous. Oral Phase Dysfunction  Oral Phase  Oral Phase: WFL  Oral Phase  Oral Phase - Comment: Pt with anterior loss of liquid from spoon and cup, reduced labial seal around spoon. Suspect oral holding (vs significant delayed swallow initiation) for both puree / thin liquids. Disorganized lingual motion for AP transport suspected. Indicators of Pharyngeal Phase Dysfunction   Pharyngeal Phase   Pharyngeal Phase: Delayed swallow suspected requiring verbal cues to swallow. No immediate s/s of aspiration though delayed coughing after trials were concluded. Prognosis  Individuals consulted  Consulted and agree with results and recommendations: Patient;RN  RN Name: Elena Seals    Education  Patient Education: Pt not able to recieve / comprehend education, RN present  Safety Devices in place: Yes  Type of devices: All fall risk precautions in place       Therapy Time  SLP Individual Minutes  Time In: 0810  Time Out: 0830  Minutes: 20     SLP Total Treatment Time  Total Treatment Time: 20    Pt's goal: None stated     Plan:  Continue goals per POC  Recommended diet:   - NPO with consistent oral care (3x daily)   - Ice chips may be provided with supervision after oral care   - Medications may be crushed with puree if needed (cues to swallow)   - SLP to follow  Pt's discharge plan: Unable to state  Discharge Plan: To be determined closer to discharge  Discussed with RNElena  Needs within reach.      Pg # [id number]  This document will serve as a discharge summary if pt discharge before next treatment   session    [address] [place] [address], [address] [place] [id number]    6/20/2022 9:27 AM

## 2022-06-20 NOTE — PLAN OF CARE
Problem: Discharge Planning  Goal: Discharge to home or other facility with appropriate resources  6/19/2022 2026 by Martin Pro RN  Outcome: Progressing  Flowsheets (Taken 6/19/2022 1958 by Jv Prabhakar RN)  Discharge to home or other facility with appropriate resources: Identify barriers to discharge with patient and caregiver  6/19/2022 0843 by Tariq Mccloud RN  Outcome: Progressing  Flowsheets (Taken 6/19/2022 0810 by Martin Pro RN)  Discharge to home or other facility with appropriate resources: Identify barriers to discharge with patient and caregiver     Problem: Pain  Goal: Verbalizes/displays adequate comfort level or baseline comfort level  6/19/2022 2026 by Martin Pro RN  Outcome: Progressing  6/19/2022 0843 by Tariq Mccloud RN  Outcome: Progressing     Problem: Safety - Adult  Goal: Free from fall injury  6/19/2022 2026 by Martin Pro RN  Outcome: Progressing  6/19/2022 0843 by Tariq Mccloud RN  Outcome: Progressing  Flowsheets (Taken 6/18/2022 2320)  Free From Fall Injury:   Instruct family/caregiver on patient safety   Based on caregiver fall risk screen, instruct family/caregiver to ask for assistance with transferring infant if caregiver noted to have fall risk factors     Problem: Neurosensory - Adult  Goal: Achieves stable or improved neurological status  6/19/2022 2026 by Martin Pro RN  Outcome: Progressing  Flowsheets (Taken 6/19/2022 1230)  Achieves stable or improved neurological status: Assess for and report changes in neurological status  6/19/2022 0843 by Tariq Mccloud RN  Outcome: Progressing     Problem: Cardiovascular - Adult  Goal: Absence of cardiac dysrhythmias or at baseline  6/19/2022 2026 by Mratin Pro RN  Outcome: Progressing  6/19/2022 0843 by Tariq Mccloud RN  Outcome: Progressing  Flowsheets (Taken 6/19/2022 0810 by Martin Pro RN)  Absence of cardiac dysrhythmias or at baseline: Monitor cardiac rate and rhythm     Problem: Respiratory - Adult  Goal: Achieves optimal ventilation and oxygenation  6/19/2022 2026 by Miran Samayoa RN  Outcome: Progressing  Flowsheets (Taken 6/19/2022 1958 by Vinita Nelson RN)  Achieves optimal ventilation and oxygenation:   Assess for changes in respiratory status   Respiratory therapy support as indicated  6/19/2022 0843 by Nikunj Bills RN  Outcome: Progressing     Problem: Genitourinary - Adult  Goal: Urinary catheter remains patent  6/19/2022 2026 by Mirna Samayoa RN  Outcome: Progressing  Flowsheets (Taken 6/19/2022 1958 by Vinita Nelson RN)  Urinary catheter remains patent: Assess patency of urinary catheter  6/19/2022 0843 by Nikunj Bills RN  Outcome: Progressing     Problem: Metabolic/Fluid and Electrolytes - Adult  Goal: Electrolytes maintained within normal limits  6/19/2022 2026 by Mirna Samayoa RN  Outcome: Progressing  Flowsheets (Taken 6/19/2022 1958 by Vinita Nelson RN)  Electrolytes maintained within normal limits: Monitor labs and assess patient for signs and symptoms of electrolyte imbalances  6/19/2022 0843 by Nikunj Bills RN  Outcome: Progressing  Flowsheets (Taken 6/19/2022 0810 by Mirna Samayoa RN)  Electrolytes maintained within normal limits: Monitor labs and assess patient for signs and symptoms of electrolyte imbalances     Problem: Skin/Tissue Integrity - Adult  Goal: Skin integrity remains intact  6/19/2022 2026 by Mirna Samayoa RN  Outcome: Progressing  6/19/2022 0843 by Nikunj Bills RN  Outcome: Progressing  Flowsheets  Taken 6/19/2022 0810 by Mirna Samayoa RN  Skin Integrity Remains Intact: Monitor for areas of redness and/or skin breakdown  Taken 6/18/2022 2320 by Nikunj Bills RN  Skin Integrity Remains Intact:   Monitor for areas of redness and/or skin breakdown   Assess vascular access sites hourly     Problem: Musculoskeletal - Adult  Goal: Return mobility to safest level of

## 2022-06-20 NOTE — PLAN OF CARE
Problem: Safety - Adult  Goal: Free from fall injury  6/20/2022 0210 by Larry Frost RN  Outcome: Progressing  6/19/2022 2026 by Silvio Childers RN  Outcome: Progressing     Problem: Respiratory - Adult  Goal: Achieves optimal ventilation and oxygenation  6/20/2022 0210 by Larry Frost RN  Outcome: Progressing  6/19/2022 2026 by Silvio Childers RN  Outcome: Progressing  Flowsheets (Taken 6/19/2022 1958 by Larry Frost RN)  Achieves optimal ventilation and oxygenation:   Assess for changes in respiratory status   Respiratory therapy support as indicated     Problem: Genitourinary - Adult  Goal: Urinary catheter remains patent  6/19/2022 2026 by Silvio Childers RN  Outcome: Progressing  Flowsheets (Taken 6/19/2022 1958 by Larry Frost RN)  Urinary catheter remains patent: Assess patency of urinary catheter      Problem: Skin/Tissue Integrity - Adult  Goal: Skin integrity remains intact  6/19/2022 2026 by Silvio Childers RN  Outcome: Progressing

## 2022-06-20 NOTE — PROGRESS NOTES
Occupational Therapy  Facility/Department: [place] [address] PCU  Occupational Therapy Initial Assessment/Treatment    Name: Tala Hazel  : 1935  MRN: [id number]  Date of Service: 2022    Discharge Recommendations:  Tala Hazel scored a 6/24 on the AM-PAC ADL Inpatient form. Current research shows that an AM-PAC score of 17 or less is typically not associated with a discharge to the patient's home setting. Based on the patient's AM-PAC score and their current ADL deficits, it is recommended that the patient have 3-5 sessions per week of Occupational Therapy at d/c to increase the patient's independence. Please see assessment section for further patient specific details. If patient discharges prior to next session this note will serve as a discharge summary. Please see below for the latest assessment towards goals. OT Equipment Recommendations  Equipment Needed: No  Other: defer       Patient Diagnosis(es): The primary encounter diagnosis was Altered mental status, unspecified altered mental status type. A diagnosis of NSTEMI (non-ST elevated myocardial infarction) [facility]) was also pertinent to this visit. 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 Past Surgical History:  has no past surgical history on file. Treatment Diagnosis: Impaired ADLs, mobility, cognition      Assessment   Performance deficits / Impairments: Decreased functional mobility ; Decreased ADL status; Decreased strength;Decreased cognition;Decreased safe awareness  Assessment: Pt unable to provide history, per chart, daughter reports pt primarily able to complete her own ADLs at home. Pt presents significantly below that reported baseline, requiring total assist for ADLs at this time and total assist for repositioning in bed. Pt with stiffness in both LEs and R UE.  Pt has AMS with difficulty following directions today. Pt may benefit from OT tx at d/c. Will follow for acute OT. Per chart, daughter hoping for pt d/c to SNF then return home  Treatment Diagnosis: Impaired ADLs, mobility, cognition  Prognosis: Fair  Decision Making: High Complexity  REQUIRES OT FOLLOW-UP: Yes  Activity Tolerance  Activity Tolerance: Patient limited by pain; Patient limited by fatigue;Treatment limited secondary to decreased cognition        Plan   Plan  Times per Week: 2-5     Restrictions  Position Activity Restriction  Other position/activity restrictions: up with assist    Subjective   General  Chart Reviewed: Yes  Additional Pertinent Hx: Pt presented after slumping over in chair at home, AMS. In ED EKG showed ST elevation- cardiology consulted and STEMI ruled out. CTH negative for any intracranial abnormality. CXR No acute cardiopulmonary findings. Admitted for MIGUELITO, leukocytosis, acute encephalopathy  PMhx: dementia, CKD, CAD, CHF, HTN, cardiomyopathy with EF 45-50%  Family / Caregiver Present: No  Referring Practitioner: Dr. Lyssa Lisa  Diagnosis: STEMI, AMS  Subjective  Subjective: Pt R sidelying in bed with LEs flexed in fetal position. Pt able to be aroused and able to answer simple questions with extra time but kept eyes closed most of session. Would briefly open eyes with prompting. Cried out with pain with passive movement of R UE   General: noted blood leaking from RBC transfusion site- RN made aware and came to room to address during session       Social/Functional History  Social/Functional History  Additional Comments: Pt unable to provide history aside from saying she lives with her daughter and uses a walker. Per Palliative care note 6/20: daughter reports  pt is mostly independent at home but is very slow in completing ADLs, so dtr assists her sometimes       Objective   O2 Device: Nasal cannula- 2L O2    Vision: Difficult to assess.  Pt only briefly opened her eyes  Hearing: Exceptions to WFL  Hearing Exceptions: Hard of hearing/hearing concerns             Safety Devices  Type of Devices: Nurse notified;Call light within reach; Left in bed;Bed alarm in place           AROM:  (WFL AAROM L and R (initially hard to passively move R- resistance vs stiffness?))  Strength:  (pt volitionally moved L UE and R elbow, pt grossly weakened. Unable to follow commands for MMT)       ADL  Feeding: NPO  Grooming: Dependent/Total (wash face at bed level- pt handed washcloth and with prompting she grasped wash cloth but did not initiate washing her face as prompted)  UE Dressing: Dependent/Total (change gown)  LE Dressing: Dependent/Total (socks)  Toileting: Dependent/Total (pennington catheter)  Additional Comments: Poor patient initiation during face washing and gown change despite verbal and tactile cues for task           Bed mobility  Rolling to Left: Dependent/Total  Scooting: Dependent/Total (to [facility])  Bed Mobility Comments: Pt right sidelying in bed, trunk stiff and resistive to passive movement. RN present and assisted repositioning pt in supine using wedge pillow to prevent pt rolling R. In supine pt unable to extend hips and knees to rest LEs on bed. With passive assist able to partially straighten LEs in bed       Transfers  Sit to stand: Unable to assess  Stand to sit: Unable to assess        Cognition  Overall Cognitive Status: Exceptions  Arousal/Alertness: Delayed responses to stimuli  Following Commands: Inconsistently follows commands  Memory: Decreased recall of biographical Information;Decreased recall of recent events  Initiation: Requires cues for all  Cognition Comment: delayed processing and initiation noted.  Pt minimally conversant, did provide appropriate responses to questions, other times spoke non-sensically to herself          Treatment consisted of:  ADLs, repositioning in bed             AM-PAC Score  AM-[facility] Inpatient Daily Activity Raw Score: 6 (06/20/22 2447)  AM-PAC Inpatient ADL T-Scale Score : 17.07 (06/20/22 1612)  ADL Inpatient CMS 0-100% Score: 100 (06/20/22 1612)  ADL Inpatient CMS G-Code Modifier : CN (06/20/22 1612)    Goals  Short Term Goals  Time Frame for Short term goals: d/c  Short Term Goal 1: rolling in bed min A  Short Term Goal 2: Improve AROM B UE to [facility] for engagement in ADL tasks  Short Term Goal 3: complete simple grooming with min A  Short Term Goal 4: tolerate supine>sit transfer       Therapy Time   Individual Concurrent Group Co-treatment   Time In 1520         Time Out 1600         Minutes 40         Timed Code Treatment Minutes: 25 Minutes +15 min abner Delong, OT

## 2022-06-20 NOTE — PROGRESS NOTES
Occupational and Physical Therapy  HOLD  Therapy evaluation orders received. RN requested therapy hold at this time due to pt's low blood sugar. Will follow up later today as schedule allows.     Steward Apgar, OT [id number]  [facility], DPT PT [id number]

## 2022-06-21 LAB
ALBUMIN SERPL-MCNC: 3.1 G/DL (ref 3.4–5)
ALP BLD-CCNC: 77 U/L (ref 40–129)
ALT SERPL-CCNC: 527 U/L (ref 10–40)
ANION GAP SERPL CALCULATED.3IONS-SCNC: 11 MMOL/L (ref 3–16)
AST SERPL-CCNC: 377 U/L (ref 15–37)
BASOPHILS ABSOLUTE: 0 K/UL (ref 0–0.2)
BASOPHILS RELATIVE PERCENT: 0 %
BILIRUB SERPL-MCNC: 0.6 MG/DL (ref 0–1)
BILIRUBIN DIRECT: <0.2 MG/DL (ref 0–0.3)
BILIRUBIN, INDIRECT: ABNORMAL MG/DL (ref 0–1)
BUN BLDV-MCNC: 36 MG/DL (ref 7–20)
CALCIUM SERPL-MCNC: 10.1 MG/DL (ref 8.3–10.6)
CHLORIDE BLD-SCNC: 107 MMOL/L (ref 99–110)
CO2: 20 MMOL/L (ref 21–32)
CREAT SERPL-MCNC: 1.4 MG/DL (ref 0.6–1.2)
EOSINOPHILS ABSOLUTE: 0.1 K/UL (ref 0–0.6)
EOSINOPHILS RELATIVE PERCENT: 0.5 %
FERRITIN: 601.4 NG/ML (ref 15–150)
GFR AFRICAN AMERICAN: 43
GFR NON-AFRICAN AMERICAN: 36
GLUCOSE BLD-MCNC: 103 MG/DL (ref 70–99)
GLUCOSE BLD-MCNC: 106 MG/DL (ref 70–99)
GLUCOSE BLD-MCNC: 37 MG/DL (ref 70–99)
GLUCOSE BLD-MCNC: 40 MG/DL (ref 70–99)
GLUCOSE BLD-MCNC: 424 MG/DL (ref 70–99)
GLUCOSE BLD-MCNC: 53 MG/DL (ref 70–99)
GLUCOSE BLD-MCNC: 56 MG/DL (ref 70–99)
GLUCOSE BLD-MCNC: 60 MG/DL (ref 70–99)
GLUCOSE BLD-MCNC: 76 MG/DL (ref 70–99)
GLUCOSE BLD-MCNC: 87 MG/DL (ref 70–99)
GLUCOSE BLD-MCNC: 91 MG/DL (ref 70–99)
HCT VFR BLD CALC: 24.9 % (ref 36–48)
HEMOGLOBIN: 8.4 G/DL (ref 12–16)
LYMPHOCYTES ABSOLUTE: 0.4 K/UL (ref 1–5.1)
LYMPHOCYTES RELATIVE PERCENT: 3.4 %
MAGNESIUM: 2 MG/DL (ref 1.8–2.4)
MCH RBC QN AUTO: 29.5 PG (ref 26–34)
MCHC RBC AUTO-ENTMCNC: 33.6 G/DL (ref 31–36)
MCV RBC AUTO: 87.8 FL (ref 80–100)
MONOCYTES ABSOLUTE: 1.1 K/UL (ref 0–1.3)
MONOCYTES RELATIVE PERCENT: 9.1 %
NEUTROPHILS ABSOLUTE: 10.9 K/UL (ref 1.7–7.7)
NEUTROPHILS RELATIVE PERCENT: 87 %
PDW BLD-RTO: 14.9 % (ref 12.4–15.4)
PERFORMED ON: ABNORMAL
PERFORMED ON: NORMAL
PLATELET # BLD: 73 K/UL (ref 135–450)
PLATELET SLIDE REVIEW: ADEQUATE
PMV BLD AUTO: 11.7 FL (ref 5–10.5)
POTASSIUM SERPL-SCNC: 3.8 MMOL/L (ref 3.5–5.1)
RBC # BLD: 2.84 M/UL (ref 4–5.2)
SODIUM BLD-SCNC: 138 MMOL/L (ref 136–145)
TOTAL CK: 1637 U/L (ref 26–192)
TOTAL PROTEIN: 6.2 G/DL (ref 6.4–8.2)
VITAMIN D 25-HYDROXY: 55.7 NG/ML
WBC # BLD: 12.5 K/UL (ref 4–11)

## 2022-06-21 PROCEDURE — 97535 SELF CARE MNGMENT TRAINING: CPT

## 2022-06-21 PROCEDURE — 97162 PT EVAL MOD COMPLEX 30 MIN: CPT

## 2022-06-21 PROCEDURE — 82550 ASSAY OF CK (CPK): CPT

## 2022-06-21 PROCEDURE — 82728 ASSAY OF FERRITIN: CPT

## 2022-06-21 PROCEDURE — 6360000002 HC RX W HCPCS: Performed by: STUDENT IN AN ORGANIZED HEALTH CARE EDUCATION/TRAINING PROGRAM

## 2022-06-21 PROCEDURE — 83735 ASSAY OF MAGNESIUM: CPT

## 2022-06-21 PROCEDURE — 85025 COMPLETE CBC W/AUTO DIFF WBC: CPT

## 2022-06-21 PROCEDURE — 2580000003 HC RX 258: Performed by: STUDENT IN AN ORGANIZED HEALTH CARE EDUCATION/TRAINING PROGRAM

## 2022-06-21 PROCEDURE — 97530 THERAPEUTIC ACTIVITIES: CPT

## 2022-06-21 PROCEDURE — 80048 BASIC METABOLIC PNL TOTAL CA: CPT

## 2022-06-21 PROCEDURE — 82947 ASSAY GLUCOSE BLOOD QUANT: CPT

## 2022-06-21 PROCEDURE — 36415 COLL VENOUS BLD VENIPUNCTURE: CPT

## 2022-06-21 PROCEDURE — 99233 SBSQ HOSP IP/OBS HIGH 50: CPT | Performed by: NURSE PRACTITIONER

## 2022-06-21 PROCEDURE — 84238 ASSAY NONENDOCRINE RECEPTOR: CPT

## 2022-06-21 PROCEDURE — 51702 INSERT TEMP BLADDER CATH: CPT

## 2022-06-21 PROCEDURE — 92526 ORAL FUNCTION THERAPY: CPT

## 2022-06-21 PROCEDURE — 99233 SBSQ HOSP IP/OBS HIGH 50: CPT | Performed by: INTERNAL MEDICINE

## 2022-06-21 PROCEDURE — 84466 ASSAY OF TRANSFERRIN: CPT

## 2022-06-21 PROCEDURE — 80076 HEPATIC FUNCTION PANEL: CPT

## 2022-06-21 PROCEDURE — 2060000000 HC ICU INTERMEDIATE R&B

## 2022-06-21 RX ADMIN — DEXTROSE MONOHYDRATE 250 ML: 10 INJECTION, SOLUTION INTRAVENOUS at 12:32

## 2022-06-21 RX ADMIN — CEFTRIAXONE 1000 MG: 1 INJECTION, POWDER, FOR SOLUTION INTRAMUSCULAR; INTRAVENOUS at 23:42

## 2022-06-21 RX ADMIN — DEXTROSE MONOHYDRATE 125 ML: 10 INJECTION, SOLUTION INTRAVENOUS at 12:56

## 2022-06-21 RX ADMIN — CEFEPIME HYDROCHLORIDE 1000 MG: 1 INJECTION, POWDER, FOR SOLUTION INTRAMUSCULAR; INTRAVENOUS at 09:52

## 2022-06-21 RX ADMIN — SODIUM CHLORIDE, PRESERVATIVE FREE 10 ML: 5 INJECTION INTRAVENOUS at 20:40

## 2022-06-21 RX ADMIN — SODIUM CHLORIDE: 9 INJECTION, SOLUTION INTRAVENOUS at 09:50

## 2022-06-21 RX ADMIN — SODIUM CHLORIDE, PRESERVATIVE FREE 10 ML: 5 INJECTION INTRAVENOUS at 09:53

## 2022-06-21 ASSESSMENT — PAIN SCALES - GENERAL
PAINLEVEL_OUTOF10: 0

## 2022-06-21 NOTE — PROGRESS NOTES
Physician Progress Note      Kim Todd  [facility] #:                  [id number]  :                       1935  ADMIT DATE:       2022 11:26 AM  [address] [place] DATE:  RESPONDING  PROVIDER #:        Ricardo Ramey MD          QUERY TEXT:    Patient admitted with AMS. Noted documentation of sepsis in the H&P. In order   to support the diagnosis of sepsis, please include additional clinical   indicators in your documentation. Or please document if the diagnosis of   sepsis has been ruled out after further study. The medical record reflects the following:  Risk Factors: 83 yo from SNF w/ AMS  Clinical Indicators: Per H&P: Evolving sepsis. WBC 14. - 18.1. No other   sirs. procalcitonin 18.84. Per PN :  Leukocytosis- SIRS 1/4 at admission   (T 99.0, HR 62, RR 14, WBC 14.0*). Treatment:  Lactic acid, procalcitonin, blood culture, IV Cefepime  Options provided:  -- Sepsis was ruled out after study  -- Sepsis present as evidenced by, Please document evidence. -- Other - I will add my own diagnosis  -- Disagree - Not applicable / Not valid  -- Disagree - Clinically unable to determine / Unknown  -- Refer to Clinical Documentation Reviewer    PROVIDER RESPONSE TEXT:    Sepsis was ruled out after study.     Query created by: Emiliano Jacques on 2022 9:56 AM      Electronically signed by:  Ricardo Ramey MD 2022 8:46 PM

## 2022-06-21 NOTE — CONSULTS
Nephrology Progress Note                                                                                                                                                                                                                                                                                                                                                               Office : [phone]     Fax :[phone]              Patient's Name: Neal Reyes  6/21/2022    History of Present Ilness:    Neal Reyes is a 82 y.o. past medical history significant for CKD, hypertension, hyperlipidemia, cardiomyopathy with LV ejection fraction of 45 to 50%, severe LVH, grade 3 diastolic dysfunction, no major obstructive CAD in 2013, was brought into the hospital via EMS secondary to altered mental status. History is limited from the patient. Cr elevated     Interval History: NAONE. Patient alert to person and time but not place or situation. Unable to provide ROS. Past Medical History:   Diagnosis Date    Amyloidosis ([facility] [address]     suspected by PCP    CAD (coronary artery disease)     CKD (chronic kidney disease)     baseline ~1.5    MI (myocardial infarction) (HCC)     Systolic CHF ([facility] [address]     EF 45%       History reviewed. No pertinent surgical history. History reviewed. No pertinent family history. has an unknown smoking status. She has never used smokeless tobacco. She reports that she does not drink alcohol and does not use drugs. Allergies:  Patient has no known allergies.     Current Medications:    0.9 % sodium chloride infusion, PRN  lidocaine PF 1 % injection 5 mL, Once  sodium chloride flush 0.9 % injection 5-40 mL, 2 times per day  sodium chloride flush 0.9 % injection 5-40 mL, PRN  0.9 % sodium chloride infusion, PRN  cefepime (MAXIPIME) 1000 mg IVPB minibag, Q12H  sodium chloride flush 0.9 % injection 5-40 mL, 2 times per day  sodium chloride flush 0.9 % injection 5-40 mL, PRN  0.9 % sodium BILITOT 0.6 0.7 0.6   ALKPHOS 51 60 77     Troponin:   Recent Labs     06/19/22  1932 06/20/22  0337 06/20/22  1600   TROPONINI 0.46* 0.40*  0.38* 0.30*     BNP: No results for input(s): BNP in the last 72 hours. Lipids:   Recent Labs     06/19/22  0528   CHOL 125   TRIG 103   HDL 41   LDLCALC 63   LABVLDL 21     ABGs: No results for input(s): PHART, PO2ART, PHH5IUB in the last 72 hours. INR:   Recent Labs     06/18/22  1136   INR 1.28*     UA:  Recent Labs     06/18/22  1345 06/18/22  1345 06/19/22  1745   COLORU Yellow  --   --    CLARITYU SL CLOUDY*  --   --    GLUCOSEU Negative  --   --    BILIRUBINUR Negative  --   --    KETUA Negative  --   --    SPECGRAV >=1.030  --   --    BLOODU LARGE*  --   --    PHUR 5.5   < > 3.0   PROTEINU 100*  --   --    UROBILINOGEN 0.2  --   --    NITRU Negative  --   --    LEUKOCYTESUR Negative  --   --    LABMICR YES  --   --    URINETYPE NotGiven  --   --     < > = values in this interval not displayed. Urine Microscopic:   Recent Labs     06/18/22  1345   BACTERIA 4+*   WBCUA 10-20*   RBCUA 3-4     Urine Culture:   Recent Labs     06/18/22  1345   LABURIN >100,000 CFU/ml     Urine Chemistry:   Recent Labs     06/19/22  1745   LABCREA 55.4  55.5   PROTEINUR 28.00*   NAUR 40             IMAGING:  US ABDOMEN LIMITED   Final Result      1. Normal ultrasound right upper quadrant abdomen. MRI BRAIN WO CONTRAST   Final Result      1. No evidence of early ischemic injury. 2. Mild cortical atrophy. 3. Moderately severe periventricular white matter disease. CT CHEST WO CONTRAST   Final Result      1. Bilateral lower lobe airspace disease, moderate on the right and mild on left. CT ABDOMEN PELVIS WO CONTRAST Additional Contrast? None   Final Result      No acute abdominopelvic abnormality. INCIDENTAL FINDINGS:      XR CHEST PORTABLE   Final Result      No acute cardiopulmonary findings.          CT HEAD WO CONTRAST   Final Result      Cerebral atrophy. Evidence of diffuse chronic small vessel white matter disease bilaterally. No acute intracranial findings. Assessment/Plan   1. MIGUELITO     2. HTN    3. Anemia    4. Acid- base/ Electrolyte imbalance     5.  AMS       Plan   - Creatinine improved (recent 1.4)  - continue IVF   - stop diuretics   - Monitor UO and renal function   - ECHO found normal LV size and normal EF 60-65%  - Urine Protein/Creat ratio 0.5  - ProteinUr 28   CrUr 55.4     Recommend to dose adjust all medications  based on renal functions  Maintain SBP> 90 mmHg   Daily weights   AVOID NSAIDs  Avoid Nephrotoxins  Monitor Intake/Output  Call if significant decrease in urine output       [address] [facility] [employer] MS4 acting as Dayanna Jaramillo MD      Thank you for allowing us to participate in care of [address] [place] [address] free to contact me   [facility] of [address]  Office : [phone]  Fax :[phone]

## 2022-06-21 NOTE — PROGRESS NOTES
care     Advance care planning     Altered mental status     MIGUELITO (acute kidney injury) ([facility] [address] [facility]     Electrolyte imbalance     Chronic combined systolic and diastolic CHF (congestive heart failure) ([facility] [address] 08/08/2013    Syncope 08/05/2013    Bradycardia 08/05/2013    Chronic kidney disease (CKD), stage III (moderate) (HCC) 02/20/2013    Dementia ([facility] [address] 02/20/2013    NSTEMI (non-ST elevated myocardial infarction) ([facility] 02/19/2013        Assessment     AMS  / UTI / improving   Leukocytosis   on abx     Troponin improved (0.70 > 0.57) flat   Not STEMI  early repolarization syndrome, benign form    TTE 6/20/2022 Normal left ventricle size, wall thickness, and systolic function with an estimated ejection fraction of 60-65%. No regional wall motion abnormalities  Diastolic filling parameters suggests normal diastolic function. Mild mitral and tricuspid regurgitation  Estimated pulmonary artery systolic pressure is at 35 mmHg assuming a right atrial pressure of 3 mmHg. HFpEF acute on chronic   Improving     ASH / CRI 2/2 dehydration  /  improving   Dr Gaby Morillo following   Good output     Thrombocytopenia  Improving     Anemia  IM following   Received PRBC 6/20/2021     low glucose  Managed per IM     HLD  LDL 63 optimal     Debility    OT/PT  Discussing palliative care & possible SNF with daughter     Plan:   TTE unremarkable   meds maxipime for UTI   No plan for stress or LHC unless clinical pic changes   Discussing palliative care & possible SNF with daughter   Sign off cardiology / call for any cardiac issues     Thank you for allowing to us to participate in the [facility] of [facility].   Bob Horn APRN-CNP-CVNP

## 2022-06-21 NOTE — PROGRESS NOTES
Occupational Therapy  Facility/Department: [facility] PCU  Daily Treatment    Name: Mel Ingram  : 1935  MRN: [id number]  Date of Service: 2022    Discharge Recommendations:  Mel Ingram scored a 6/24 on the AM-PAC ADL Inpatient form. Current research shows that an AM-PAC score of 17 or less is typically not associated with a discharge to the patient's home setting. Based on the patient's AM-PAC score and their current ADL deficits, it is recommended that the patient have 3-5 sessions per week of Occupational Therapy at d/c to increase the patient's independence. Please see assessment section for further patient specific details. If patient discharges prior to next session this note will serve as a discharge summary. Please see below for the latest assessment towards goals. OT Equipment Recommendations  Equipment Needed: No  Other: defer       Patient Diagnosis(es): The primary encounter diagnosis was Altered mental status, unspecified altered mental status type. A diagnosis of NSTEMI (non-ST elevated myocardial infarction) [facility]) was also pertinent to this visit. Past Medical History:  has a past medical history of Amyloidosis ([facility] [address] CAD (coronary artery disease), CKD (chronic kidney disease), MI (myocardial infarction) ([facility] [address] and Systolic CHF ([facility] [address] Past Surgical History:  has no past surgical history on file. Treatment Diagnosis: Impaired ADLs, mobility, cognition      Assessment   Performance deficits / Impairments: Decreased functional mobility ; Decreased ADL status; Decreased strength;Decreased cognition;Decreased safe awareness;Decreased ROM; Decreased balance;Decreased posture;Decreased vision/visual deficit; Decreased endurance;Decreased fine motor control;Decreased coordination  Assessment: Pt unable to provide history, per chart, daughter reports pt primarily able to complete her own ADLs at home.  Pt presents significantly below that reported baseline, requiring total assist for ADLs at this time and total assist for bed mobility and stand pivot t/f to chair. Pt with stiffness in both LEs and R UE. Pt has AMS with difficulty following directions today. Pt may benefit from OT tx at d/c. Will follow for acute OT. Per chart, daughter hoping for pt d/c to SNF then return home  Treatment Diagnosis: Impaired ADLs, mobility, cognition  Prognosis: Fair  REQUIRES OT FOLLOW-UP: Yes  Activity Tolerance  Activity Tolerance: Patient limited by pain; Patient limited by fatigue;Treatment limited secondary to decreased cognition        Plan   Plan  Times per Week: 2-5  Current Treatment Recommendations: Strengthening,ROM,Balance training,Functional mobility training,Endurance training,Patient/Caregiver education & training,Safety education & training,Self-Care / ADL,Coordination training,Cognitive/Perceptual training,Positioning,Pain management,Cognitive reorientation     Restrictions  Position Activity Restriction  Other position/activity restrictions: up with assist    Subjective   General  Chart Reviewed: Yes  Patient assessed for rehabilitation services?: Yes  Additional Pertinent Hx: Pt presented after slumping over in chair at home, AMS. In ED EKG showed ST elevation- cardiology consulted and STEMI ruled out. CTH negative for any intracranial abnormality. CXR No acute cardiopulmonary findings. Admitted for MIGUELITO, leukocytosis, acute encephalopathy  PMhx: dementia, CKD, CAD, CHF, HTN, cardiomyopathy with EF 45-50%  Family / Caregiver Present: No  Referring Practitioner: Dr. Edwin Alfaro  Diagnosis: STEMI, AMS  Subjective  Subjective: Pt R sidelying in bed with LEs flexed in fetal position. RN cleared pt for tx. Pt lethargic. Pt able to be aroused with max stimuli. Pt able to answer simple questions with extra time. Would briefly open eyes with prompting. Cried out with pain with passive movement of extremities.   Pain: patient reports pain in right leg with mobility, not rated, RN aware  Social/Functional History  Social/Functional History  Lives With: Daughter  Type of Home: Apartment  Home Layout: One level  Home Access: Stairs to enter with rails  Entrance Stairs - Number of Steps: 5  Home Equipment: Will noman Polk  ADL Assistance: Independent (patient reports independent with bathing and dressing)  Homemaking Assistance: Needs assistance (daughter performs)  Homemaking Responsibilities: No  Ambulation Assistance: Independent (short distances with walker)  Transfer Assistance: Independent  Active : No  Additional Comments: Patient is a questionable historian but provided above information. Objective              Safety Devices  Type of Devices: Nurse notified;Call light within reach; Left in chair;Chair alarm in place;Gait belt           Tone: Abnormal (RLE with increased tone) Pt pushing LUE into extension when sitting EOB. Sitting balance: Max A for static sitting balance, L posterior lean, limited awareness of errors, deficits, needing assist to correct to midline, unable to hold without assist.    ADL  Feeding: Dependent/Total (SLP present for swallowing assessment while PT/OT working on sitting balance EOB (midline posture, visual tracking, passive cervical stretching, AAROM UE/LEs, etc))  LE Dressing: Dependent/Total (socks)  Toileting: Dependent/Total (pen[place])        Activity Tolerance  Activity Tolerance: Treatment limited secondary to decreased cognition;Patient limited by fatigue;Patient limited by endurance  Bed mobility  Supine to Sit: Dependent/Total;2 Person assistance (max A x2)  Scooting: Dependent/Total;2 Person assistance (max A x2)     Transfers  Sit Pivot Transfers: Dependent/Total (max A x2 bed to recliner on R side with gait belt, RN educated, but dependent lift recommended for return to bed.  RN stated understanding)        Cognition  Overall Cognitive Status: Exceptions  Arousal/Alertness: Delayed responses to stimuli  Following Commands: Inconsistently follows commands  Attention Span: Difficulty attending to directions; Difficulty dividing attention; Attends with cues to redirect  Memory: Decreased recall of biographical Information;Decreased recall of recent events  Safety Judgement: Decreased awareness of need for safety  Problem Solving: Decreased awareness of errors;Assistance required to generate solutions;Assistance required to implement solutions;Assistance required to identify errors made;Assistance required to correct errors made  Insights: Decreased awareness of deficits  Initiation: Requires cues for all  Sequencing: Requires cues for all  Cognition Comment: delayed processing and initiation; patient appears more conversant than previous days                                       Vision: R gaze preference with head and pupils to R side, able to track to midline in ~50% of attempts with verbal and visual stimuli provided.      Education: Role of OT, safe t/f training, safe use of DME, awareness of deficits, discharge planning, ADL as therapeutic exercise, importance of OOB, cognitive orientation       AM-PAC Score        AM-[facility] Inpatient Daily Activity Raw Score: 6 (06/21/22 1016)  AM-PAC Inpatient ADL T-Scale Score : 17.07 (06/21/22 1016)  ADL Inpatient CMS 0-100% Score: 100 (06/21/22 1016)  ADL Inpatient CMS G-Code Modifier : CN (06/21/22 1016)    Goals  Short Term Goals  Time Frame for Short term goals: d/c - cont all goals 6/21  Short Term Goal 1: rolling in bed min A  Short Term Goal 2: Improve AROM B UE to [facility] for engagement in ADL tasks  Short Term Goal 3: complete simple grooming with min A  Short Term Goal 4: tolerate supine>sit transfer  Patient Goals   Patient goals : None stated       Therapy Time   Individual Concurrent Group Co-treatment   Time In 0841         Time Out 0919         Minutes 38         Timed Code Treatment Minutes: 45 Minutes       If patient is discharged prior to next treatment session, this note will serve as the discharge summary.   Gael Garsia, OTR/L #[id number]

## 2022-06-21 NOTE — PROGRESS NOTES
Speech Language Pathology  Facility/Department: [place] [address] [facility]  Dysphagia Daily Treatment Note    NAME: Katie Little YOB: 1935  MRN: [id number]    Patient Diagnosis(es):   Patient Active Problem List    Diagnosis Date Noted    Generalized weakness     Encounter for palliative care     Advance care planning     Altered mental status     [facility]ELITO (acute [facility]) ([facility]     Electrolyte imbalance     Chronic combined systolic and diastolic CHF (congestive heart failure) ([facility] 2013    Syncope 2013    Bradycardia 2013    Chronic kidney disease (CKD), stage III (moderate) (HCC) 2013    Dementia ([facility] 2013    NSTEMI (non-ST elevated myocardial infarction) [facility] 2013     Allergies: No Known Allergies  Onset Date: 2022    CXR (2022)-        No acute cardiopulmonary findings.            CT Chest: (2022)  Impression       1. Bilateral lower lobe airspace disease, moderate on the right and mild on left. MRI Brain: (2022)    Impression       1. No evidence of early ischemic injury. 2. Mild cortical atrophy. 3. Moderately severe periventricular white matter disease.         Previous MBS (n/a)    Chart reviewed. Medical Diagnosis: STEMI (ST elevation myocardial infarction) ([facility] [I21.3]  NSTEMI (non-ST elevated myocardial infarction) ([facility] [I21.4]  Altered mental status, unspecified altered mental status type [R41.82]   Treatment Diagnosis: Dysphagia    BSE Impression (2022)-  Dysphagia Impression : Pt presents with high aspiration risk at this time secondary to mental status; She demonstrated delayed swallow/ oral bolus holding despite cueing to swallow. Delayed coughing noted at conclusion of trials. Recommend NPO at this time with ice chips after oral care; If pt needs PO medications, they may be crushed with puree (RN supervision and cues to ensure swallow).  SLP to follow and determine safety of diet initiation as AMS resolves. Dysphagia Diagnosis: Mild to moderate pharyngeal stage dysphagia,Moderate to severe oral stage dysphagia    MBS results- not appropriate at this time    Pain: did not clearly state    Current Diet : Diet NPO Exceptions are: Ice Chips   Recommended Form of Meds: Crushed in puree as able  Compensatory Swallowing Strategies : Total feed     Treatment:  Pt seen bedside to address the following goals:  Short-term Goals  Goal 1: Pt will participate in dynamic swallow assessment  6/21: Pt seen seated edge of bed with PT/OT. Awake and alert, but still lethargic, requiring max cues. Keeping head turned to right. On room air. Pt with positive but limited oral acceptance PO, oral holding, seemingly delayed and suspect reduced swallow movement, no overt signs of aspiration. Given limited PO acceptance and ongoing lethargy, doubt pt's ability to maintain adequate nutrition/hydration needs via PO, and given prolonged oral holding/swallow delay, recommend continue NPO, exceptions ice chips/sips water with nursing staff when fully alert/seated upright. Cont goal    Goal 2: Pt will initiate oral diet when indicated safe by SLP  6/21:     Goal 3: Pt will demonstrate understanding of recommendations from SLP  6/21: patient educated to purpose of visit, role of SLP, swallowing; no indication of comprehension. No family present    Patient/Family/Caregiver Education:  See above    Compensatory Strategies:  Compensatory Swallowing Strategies : Total feed      Plan  Diet Recommendations:     - NPO, consider temporary alternative means nutrition/hydration  - oral hygiene  - ice chips/sips H2O when fully alert/seated upright    Discharge Plan:  TBD  Discussed with RN, Jeanne, prior to visit. Needs within reach. Electronically Signed by:  Arielle Olmstead M.A., Tina Sunshine   Speech-Language Pathologist  Pager #[phone]    This document will serve as a discharge summary if pt discharges before next treatment.

## 2022-06-21 NOTE — PLAN OF CARE
Problem: Discharge Planning  Goal: Discharge to home or other facility with appropriate resources  6/20/2022 1501 by Lisa Erwin RN  Outcome: Progressing     Problem: Safety - Adult  Goal: Free from fall injury  6/20/2022 2309 by Pan Bae RN  Outcome: Progressing  6/20/2022 1501 by Lisa Erwin RN  Outcome: Progressing     Problem: Respiratory - Adult  Goal: Achieves optimal ventilation and oxygenation  6/20/2022 2309 by Pan Bae RN  Outcome: Progressing  Flowsheets (Taken 6/20/2022 1942)  Achieves optimal ventilation and oxygenation:   Assess for changes in respiratory status   Assess for changes in mentation and behavior   Position to facilitate oxygenation and minimize respiratory effort   Oxygen supplementation based on oxygen saturation or arterial blood gases   Respiratory therapy support as indicated  6/20/2022 1501 by Lisa Erwin RN  Outcome: Progressing     Problem: Genitourinary - Adult  Goal: Urinary catheter remains patent  Recent Flowsheet Documentation  Taken 6/20/2022 1942 by Pan Bae RN  Urinary catheter remains patent: Assess patency of urinary catheter     Problem: Skin/Tissue Integrity  Goal: Absence of new skin breakdown  Description: 1. Monitor for areas of redness and/or skin breakdown  2. Assess vascular access sites hourly  3. Every 4-6 hours minimum:  Change oxygen saturation probe site  4. Every 4-6 hours:  If on nasal continuous positive airway pressure, respiratory therapy assess nares and determine need for appliance change or resting period.   Outcome: Progressing

## 2022-06-21 NOTE — PROGRESS NOTES
Palliative Care Chart Review  and Check in Note:     NAME:  Leslie Fields  Admit Date: 6/18/2022  Hospital Day:  Hospital Day: 4   Current Code status: Full Code    Palliative care is continuing to following Ms. Yu for symptom management,  and goals of care discussion as needed. Patient's chart reviewed today 6/21/22. Saw pt at the bedside, she is sitting up in chair. She is getting labs drawn and groaning. D/w Dr. Sheila Florentino - pt did not pass swallow evaluation today. Called pt's daughter Alice Sri x2 to discuss, however call will not go through. Called pt's sister Berenice Leal, phone line picked up however no one answered on the other end. An Stephenson was able to reach Alice Sri via her phone yesterday. Will continue to reach out. The following are the currently established goals/code status, and Symptom management. Goals of care: Continue with current management, plan to discuss further when able to reach pt's daughter. Code status: Full Code    Discharge plan: TBD pending hospital course, plan has been for SNF however SNF unlikely to accept without PEG tube if pt unsafe for PO intake.        Darrick Chiang, FABRICIO - CNP  06/21/22  2:55 PM

## 2022-06-21 NOTE — PROGRESS NOTES
Progress Note    Admit Date: 6/18/2022  Day: 8  Diet: Diet NPO Exceptions are: Ice Chips    CC: AMS    Interval history: NAEO, Hgb 8.4 this morning. Pt is awakens. She is alert to per Person and time and place    HPI: Neal Reyes omar 83 yo F with PMH of demenita, HLD, CAD, CKD, HFrEF Who presents from home. For AMS, pt sitting at home was slumped over in her chair and brought to the ED. Pt was altered on arrival, non-verbal but opens eyes and responds to pain.     In the ED EKG showed ST elevation in lateral and inferior leads code STEMI activated but pt did not get any intervention due to being altered. Pt afebrile, HDS. Pro-BNP elevated 7360, Troponin elevated 0.70, MIGUELITO 3.3. WBC 14.0, Hgb 7.7, platelet 60, pT 95.5, INR 1.28, aPTT 20.7. U/A cloudy, specific gravity >1.030, Large blood, protein, 10-20 WBC 3+ bacteria. CTH negative for any intracranial abnormality. CXR No acute cardiopulmonary findings.     Pt admitted for further work-up of AMS.      Medications:     Scheduled Meds:   [START ON 6/22/2022] cefTRIAXone (ROCEPHIN) IV  1,000 mg IntraVENous Q24H    lidocaine 1 % injection  5 mL IntraDERmal Once    sodium chloride flush  5-40 mL IntraVENous 2 times per day    sodium chloride flush  5-40 mL IntraVENous 2 times per day     Continuous Infusions:   sodium chloride      sodium chloride      sodium chloride 5 mL/hr at 06/21/22 0950    dextrose 100 mL/hr (06/19/22 0814)     PRN Meds:sodium chloride, sodium chloride flush, sodium chloride, sodium chloride flush, sodium chloride, ondansetron **OR** ondansetron, acetaminophen **OR** acetaminophen, polyethylene glycol, glucose, dextrose bolus **OR** dextrose bolus, glucagon (rDNA), dextrose, perflutren lipid microspheres    Objective:   Vitals:   T-max:  Patient Vitals for the past 8 hrs:   BP Temp Temp src Pulse Resp SpO2   06/21/22 1239 120/71 98.2 °F (36.8 °C) Oral 72 18 97 %   06/21/22 0934 121/72 97.2 °F (36.2 °C) Oral 71 16 100 %       Intake/Output Summary (Last 24 hours) at 6/21/2022 1454  Last data filed at 6/21/2022 1405  Gross per 24 hour   Intake 1028.85 ml   Output 1735 ml   Net -706.15 ml       Review of Systems    Physical Exam  Constitutional:       Appearance: She is ill-appearing. Cardiovascular:      Rate and Rhythm: Normal rate. Pulses: Normal pulses. Heart sounds: Normal heart sounds. Pulmonary:      Effort: Pulmonary effort is normal.   Abdominal:      General: Abdomen is flat. Palpations: Abdomen is soft. Musculoskeletal:         General: Normal range of motion. Skin:     General: Skin is warm and dry. Neurological:      Mental Status: She is disoriented. LABS:    CBC:   Recent Labs     06/19/22  1252 06/19/22  1252 06/20/22  0337 06/20/22  1847 06/21/22  0439   WBC 12.2*  --  12.4*  --  12.5*   HGB 7.3*   < > 6.7* 10.2* 8.4*   HCT 22.4*   < > 20.7* 30.8* 24.9*   PLT 41*  --  39*  --  73*   MCV 92.4  --  93.5  --  87.8    < > = values in this interval not displayed. Renal:    Recent Labs     06/19/22  0528 06/19/22  1252 06/20/22  0337 06/20/22  1600 06/21/22  0439     --  137 135* 138   K 4.4  --  4.0 4.4 3.8     --  107 105 107   CO2 18*  --  19* 17* 20*   BUN 55*  --  45* 41* 36*   CREATININE 2.8*  --  2.0* 1.5* 1.4*   GLUCOSE 128*   < > 106* 98 103*   CALCIUM 9.2  --  9.8 9.9 10.1   MG 1.90  --  2.50*  --  2.00   ANIONGAP 15  --  11 13 11    < > = values in this interval not displayed. Hepatic:   Recent Labs     06/19/22  0528 06/20/22  0337 06/21/22  0439   * 550* 377*   * 581* 527*   BILITOT 0.6 0.7 0.6   BILIDIR <0.2 <0.2 <0.2   PROT 6.0* 6.3* 6.2*   LABALBU 3.1* 3.0* 3.1*   ALKPHOS 51 60 77     Troponin:   Recent Labs     06/19/22  1932 06/20/22  0337 06/20/22  1600   TROPONINI 0.46* 0.40*  0.38* 0.30*     BNP: No results for input(s): BNP in the last 72 hours.   Lipids:   Recent Labs     06/19/22  0528   CHOL 125   HDL 41     ABGs:  No results for input(s): PHART, KJI4TGP, PO2ART, IMH8QCJ, BEART, THGBART, R5PRWPGP, AXM7ZSN in the last 72 hours. INR:   No results for input(s): INR in the last 72 hours. Lactate: No results for input(s): LACTATE in the last 72 hours. Cultures:  -----------------------------------------------------------------  RAD:   US ABDOMEN LIMITED   Final Result      1. Normal ultrasound right upper quadrant abdomen. MRI BRAIN WO CONTRAST   Final Result      1. No evidence of early ischemic injury. 2. Mild cortical atrophy. 3. Moderately severe periventricular white matter disease. CT CHEST WO CONTRAST   Final Result      1. Bilateral lower lobe airspace disease, moderate on the right and mild on left. CT ABDOMEN PELVIS WO CONTRAST Additional Contrast? None   Final Result      No acute abdominopelvic abnormality. INCIDENTAL FINDINGS:      XR CHEST PORTABLE   Final Result      No acute cardiopulmonary findings. CT HEAD WO CONTRAST   Final Result      Cerebral atrophy. Evidence of diffuse chronic small vessel white matter disease bilaterally. No acute intracranial findings. Assessment/Plan:     Acute Metabolic Encephalopathy-improving  Leukocytosis-improving  Initially was only responsive to pain. Pt has baseline dementia  Likely secondary to infection, POA White count 14-->18, CTH no acute findings, CXR no acute findings, Procal 18.8. U/A with WBC 10-20 and 4+ bacteria. Empircally started on broad spectrum antibiotics of vanc and cefepime  - CTAP (6/18): no acute findings  - CT Chest (6/19): bilateral lower lobe airspace ds R>L  - MRI Brain (6/19):  No evidence of early ischemic injury, mild cortical atrophy, mod severe periventricular white matter disease  - ammonia 34, WNL  - Blood cx NGTD  - Urine cx: E. Coli, sensitive to ceftriaxone  - MRSA swab negative, d/c vanc  - d/c cefepime (6/18-6/21) cont ceftriaxone  - palliative care consult    Hypoglycemia  POCT glucose are likely inaccurate, pt fingers are cold. - Per SLP eval, NPO with ice chips, crushed meds in applesauce  - s/p D5, 1/2 /hr for 10 hrs  - serum glucose checks q12    CK elevation  CK 6133--> 1637  - fluids as above    MIGUELITO on CKD3a?-improving  Cr 3.3, POA.  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, similar to previous EKGs  - ECHO EF 88-97%, normal diastolic function    Acute on Chronic normocytic anemia  Iron studies WNL  Vitamin B12 and folate elevated  - transfuse 1u pRBCs today    Transaminitis  , 550 today, possibly due to fluctuations in BP  - hepatitis panel negative  - LFTs trending down  - RUQ U/S WNL    Thrombocytopenia  Likely 2/2 infection  - platelets improving  - f/u peripheral smear      Code Status:FULL  FEN: Diet NPO Exceptions are: Ice Chips  PPX: SCD  DISPO: Indiana Norris MD, PGY-1  06/21/22  2:54 PM    This patient has been staffed and discussed with Melany Loja MD.

## 2022-06-21 NOTE — PLAN OF CARE
Pt received at 0700 sleeping. Pt is A&O x1-2 with confusion. Pt responds and arouses to voice but is unable to sustain eye opening. Pt frequently drifts to sleep when interacting with writer. Slow to verbally respond and able to follow simple commands. Pt moved from bed to chair and back with 2x person assist. Pt is unable to weight bear at this point. All vitals are within normal limits on RA. Currently NPO due to high aspiration risk. Speech pathologist assessed pt at bedside today. Failed swallow evaluation. Good urine output noted via pennington. Pennington discontinued 1530. Blood sugar is 40. Dextrose 10% bolus prn given. Repeat blood sugar 60. Another Dextrose 10% prn given. Repeat blood sugar remains low. Physician notified and aware. STAT one time random glucose level obtained (glucose random 87).  Safety maintained    Plan: monitor blood sugar     Problem: Discharge Planning  Goal: Discharge to home or other facility with appropriate resources  Outcome: Progressing  Flowsheets  Taken 6/21/2022 1200  Discharge to home or other facility with appropriate resources: Identify barriers to discharge with patient and caregiver  Taken 6/21/2022 0900  Discharge to home or other facility with appropriate resources: Identify barriers to discharge with patient and caregiver     Problem: Pain  Goal: Verbalizes/displays adequate comfort level or baseline comfort level  Outcome: Progressing  Flowsheets  Taken 6/21/2022 1533  Verbalizes/displays adequate comfort level or baseline comfort level:   Encourage patient to monitor pain and request assistance   Assess pain using appropriate pain scale   Administer analgesics based on type and severity of pain and evaluate response   Implement non-pharmacological measures as appropriate and evaluate response  Taken 6/21/2022 1239  Verbalizes/displays adequate comfort level or baseline comfort level:   Encourage patient to monitor pain and request assistance   Assess pain using appropriate pain scale   Implement non-pharmacological measures as appropriate and evaluate response   Administer analgesics based on type and severity of pain and evaluate response  Taken 6/21/2022 0934  Verbalizes/displays adequate comfort level or baseline comfort level:   Encourage patient to monitor pain and request assistance   Administer analgesics based on type and severity of pain and evaluate response   Assess pain using appropriate pain scale   Implement non-pharmacological measures as appropriate and evaluate response     Problem: Safety - Adult  Goal: Free from fall injury  Outcome: Progressing  Flowsheets (Taken 6/21/2022 1025)  Free From Fall Injury:   Instruct family/caregiver on patient safety   Based on caregiver fall risk screen, instruct family/caregiver to ask for assistance with transferring infant if caregiver noted to have fall risk factors     Problem: Neurosensory - Adult  Goal: Achieves stable or improved neurological status  Outcome: Progressing  Flowsheets  Taken 6/21/2022 1200  Achieves stable or improved neurological status: Assess for and report changes in neurological status  Taken 6/21/2022 0900  Achieves stable or improved neurological status: Assess for and report changes in neurological status     Problem: Cardiovascular - Adult  Goal: Absence of cardiac dysrhythmias or at baseline  Outcome: Progressing  Flowsheets  Taken 6/21/2022 1200  Absence of cardiac dysrhythmias or at baseline:   Monitor cardiac rate and rhythm   Assess for signs of decreased cardiac output  Taken 6/21/2022 0900  Absence of cardiac dysrhythmias or at baseline:   Monitor cardiac rate and rhythm   Assess for signs of decreased cardiac output     Problem: Cardiovascular - Adult  Goal: Maintains optimal cardiac output and hemodynamic stability  Outcome: Progressing  Flowsheets  Taken 6/21/2022 1200  Maintains optimal cardiac output and hemodynamic stability:   Monitor blood pressure and heart rate   Monitor urine output and notify Licensed Independent Practitioner for values outside of normal range   Assess for signs of decreased cardiac output  Taken 6/21/2022 0900  Maintains optimal cardiac output and hemodynamic stability:   Monitor blood pressure and heart rate   Monitor urine output and notify Licensed Independent Practitioner for values outside of normal range   Assess for signs of decreased cardiac output     Problem: Respiratory - Adult  Goal: Achieves optimal ventilation and oxygenation  Outcome: Progressing  Flowsheets  Taken 6/21/2022 1200  Achieves optimal ventilation and oxygenation:   Assess for changes in respiratory status   Assess for changes in mentation and behavior   Position to facilitate oxygenation and minimize respiratory effort   Oxygen supplementation based on oxygen saturation or arterial blood gases   Encourage broncho-pulmonary hygiene including cough, deep breathe, incentive spirometry  Taken 6/21/2022 0900  Achieves optimal ventilation and oxygenation:   Assess for changes in respiratory status   Assess for changes in mentation and behavior   Position to facilitate oxygenation and minimize respiratory effort   Oxygen supplementation based on oxygen saturation or arterial blood gases     Problem: Genitourinary - Adult  Goal: Urinary catheter remains patent  Outcome: Progressing  Flowsheets  Taken 6/21/2022 1200  Urinary catheter remains patent: Assess patency of urinary catheter  Taken 6/21/2022 0900  Urinary catheter remains patent: Assess patency of urinary catheter     Problem: Metabolic/Fluid and Electrolytes - Adult  Goal: Electrolytes maintained within normal limits  Outcome: Progressing  Flowsheets  Taken 6/21/2022 1200  Electrolytes maintained within normal limits:   Monitor labs and assess patient for signs and symptoms of electrolyte imbalances   Administer electrolyte replacement as ordered  Taken 6/21/2022 0900  Electrolytes maintained within normal limits:   Monitor labs and assess patient for signs and symptoms of electrolyte imbalances   Administer electrolyte replacement as ordered     Problem: Skin/Tissue Integrity - Adult  Goal: Skin integrity remains intact  Outcome: Progressing  Flowsheets  Taken 6/21/2022 1200  Skin Integrity Remains Intact:   Monitor for areas of redness and/or skin breakdown   Assess vascular access sites hourly   Every 4-6 hours minimum: Change oxygen saturation probe site  Taken 6/21/2022 1025  Skin Integrity Remains Intact:   Monitor for areas of redness and/or skin breakdown   Assess vascular access sites hourly   Every 4-6 hours minimum: Change oxygen saturation probe site  Taken 6/21/2022 0900  Skin Integrity Remains Intact:   Monitor for areas of redness and/or skin breakdown   Assess vascular access sites hourly   Every 4-6 hours minimum: Change oxygen saturation probe site     Problem: Musculoskeletal - Adult  Goal: Return mobility to safest level of function  Outcome: Progressing  Flowsheets  Taken 6/21/2022 1200  Return Mobility to Safest Level of Function:   Assess patient stability and activity tolerance for standing, transferring and ambulating with or without assistive devices   Assist with transfers and ambulation using safe patient handling equipment as needed  Taken 6/21/2022 0900  Return Mobility to Safest Level of Function:   Assess patient stability and activity tolerance for standing, transferring and ambulating with or without assistive devices   Assist with transfers and ambulation using safe patient handling equipment as needed     Problem: Skin/Tissue Integrity  Goal: Absence of new skin breakdown  Description: 1. Monitor for areas of redness and/or skin breakdown  2. Assess vascular access sites hourly  3. Every 4-6 hours minimum:  Change oxygen saturation probe site  4.   Every 4-6 hours:  If on nasal continuous positive airway pressure, respiratory therapy assess nares and determine need for appliance change or resting period.   Outcome: Progressing     Problem: ABCDS Injury Assessment  Goal: Absence of physical injury  Outcome: Progressing  Flowsheets (Taken 6/21/2022 1025)  Absence of Physical Injury: Implement safety measures based on patient assessment     Problem: Chronic Conditions and Co-morbidities  Goal: Patient's chronic conditions and co-morbidity symptoms are monitored and maintained or improved  Outcome: Progressing  Flowsheets  Taken 6/21/2022 1200  Care Plan - Patient's Chronic Conditions and Co-Morbidity Symptoms are Monitored and Maintained or Improved:   Monitor and assess patient's chronic conditions and comorbid symptoms for stability, deterioration, or improvement   Collaborate with multidisciplinary team to address chronic and comorbid conditions and prevent exacerbation or deterioration   Update acute care plan with appropriate goals if chronic or comorbid symptoms are exacerbated and prevent overall improvement and discharge  Taken 6/21/2022 0900  Care Plan - Patient's Chronic Conditions and Co-Morbidity Symptoms are Monitored and Maintained or Improved:   Monitor and assess patient's chronic conditions and comorbid symptoms for stability, deterioration, or improvement   Collaborate with multidisciplinary team to address chronic and comorbid conditions and prevent exacerbation or deterioration   Update acute care plan with appropriate goals if chronic or comorbid symptoms are exacerbated and prevent overall improvement and discharge

## 2022-06-21 NOTE — PROGRESS NOTES
Physical Therapy  Facility/Department: [place] [address] [facility]  Physical Therapy Initial Assessment and treatment    Name: Sp Patel  : 1935  MRN: [id number]  Date of Service: 2022    Discharge Recommendations:    Sp Patel scored a 6/24 on the AM-PAC short mobility form. Current research shows that an AM-PAC score of 17 or less is typically not associated with a discharge to the patient's home setting. Based on the patient's AM-PAC score and their current functional mobility deficits, it is recommended that the patient have 3-5 sessions per week of Physical Therapy at d/c to increase the patient's independence. Please see assessment section for further patient specific details. PT Equipment Recommendations  Equipment Needed: No (defer to next level of care)      Patient Diagnosis(es): The primary encounter diagnosis was Altered mental status, unspecified altered mental status type. A diagnosis of NSTEMI (non-ST elevated myocardial infarction) [facility]) was also pertinent to this visit. Past Medical History:  has a past medical history of Amyloidosis ([facility] [address] CAD (coronary artery disease), CKD (chronic kidney disease), MI (myocardial infarction) ([facility] [address] and Systolic CHF ([facility] [address] Past Surgical History:  has no past surgical history on file. Assessment   Body Structures, Functions, Activity Limitations Requiring Skilled Therapeutic Intervention: Decreased functional mobility ; Decreased safe awareness;Decreased cognition;Decreased endurance;Decreased ROM; Decreased strength; Increased pain;Decreased balance;Decreased coordination  Assessment: Patient tolerated session fair with mobility being limited due to decreased cognition and overall decreased activity tolerance. Patient with delayed response time throughout and limited verbalization though does answer most questions with repetition.   Patient appears to have increased tone in right leg; maintains right cervical rotation and right gaze throughout session despite maximal verbal, visual and tactile cues. Patient provided some level of baseline function but unsure if right side preference is baseline for patient. She appears to be functioning below stated baseline of supervision. Recommend continued skilled PT upon discharge. Will follow while in acute care setting to improve functional mobility. Treatment Diagnosis: impaired mobility associated with STEMI  Therapy Prognosis: Fair  Decision Making: Medium Complexity  Requires PT Follow-Up: Yes  Activity Tolerance  Activity Tolerance: Treatment limited secondary to decreased cognition;Patient limited by fatigue;Patient limited by endurance     Plan   Plan  Plan:  (2-5)  Current Treatment Recommendations: Strengthening,ROM,Balance training,Functional mobility training,Transfer training,Endurance training,Safety education & training,Patient/Caregiver education & training,Therapeutic activities  Safety Devices  Type of Devices: Nurse notified,Call light within reach,Left in chair,Chair alarm in place,Gait belt (recommend back to bed via maxi javier lift with RN staff; RN aware)     Restrictions  Position Activity Restriction  Other position/activity restrictions: up with assist     Subjective   Pain: patient reports pain in right leg with mobility, not rated, RN aware  General  Chart Reviewed: Yes  Patient assessed for rehabilitation services?: Yes  Additional Pertinent Hx: Pt presented after slumping over in chair at home, AMS. In ED EKG showed ST elevation- cardiology consulted and STEMI ruled out. CTH negative for any intracranial abnormality. CXR No acute cardiopulmonary findings. Admitted for MIGUELITO, leukocytosis, acute encephalopathy  PMhx: dementia, CKD, CAD, CHF, HTN, cardiomyopathy with EF 45-50%  Response To Previous Treatment: Not applicable  Family / Caregiver Present: No  Referring Practitioner:  Daisy Cobb MD  Referral Date : 06/18/22  Diagnosis: STEMI  Follows Commands: Impaired  Other (Comment): minimal to no functional command following  General Comment  Comments: Patient supine in bed upon arrival.  Subjective  Subjective: RN cleared patient for treatment session. Social/Functional History  Social/Functional History  Lives With: Daughter  Type of Home: Apartment  Home Layout: One level  Home Access: Stairs to enter with rails  Entrance Stairs - Number of Steps: 5  Home Equipment: Sarajane [place], rolling  ADL Assistance: Independent (patient reports independent with bathing and dressing)  Homemaking Assistance: Needs assistance (daughter performs)  Homemaking Responsibilities: No  Ambulation Assistance: Independent (short distances with walker)  Transfer Assistance: Independent  Active : No  Additional Comments: Patient is a questionable historian but provided above information. Vision/Hearing  Vision  Vision:  (difficult to assess; patient with right gaze, unable to cross midline with verbal/visual cues)  Hearing  Hearing: Within functional limits    Cognition   Orientation  Overall Orientation Status: Impaired  Orientation Level: Oriented to person;Oriented to place; Disoriented to situation;Disoriented to time  Cognition  Overall Cognitive Status: Exceptions  Arousal/Alertness: Delayed responses to stimuli  Following Commands: Inconsistently follows commands  Memory: Decreased recall of biographical Information;Decreased recall of recent events  Safety Judgement: Decreased awareness of need for safety  Problem Solving: Decreased awareness of errors  Insights: Not aware of deficits  Initiation: Requires cues for all  Sequencing: Requires cues for all  Cognition Comment: delayed processing and initiation; patient appears more conversant than previous days     Objective   Heart Rate: 71  Heart Rate Source: Monitor  BP: 121/72  BP Location: Right upper arm  BP Method: Automatic  Patient Position: Sitting;Up in chair  MAP (Calculated): 88.33  Resp: 16  SpO2: 100 %  O2 Device: None (Room air)              PROM RLE (degrees)  RLE PROM: Exceptions  RLE General PROM: little to no active movement noted; appears to have increased tone with mobility throughout  PROM LLE (degrees)  LLE PROM: Exceptions  LLE General PROM: little to no active movement noted, but PROM [facility]  Strength RLE  Strength RLE: Exception  Comment: appears grossly weakened; limited to no active movement noted  Strength LLE  Strength LLE: Exception  Comment: appears grossly weakened; limited to no active movement noted           Bed mobility  Supine to Sit: Dependent/Total;2 Person assistance (max assist x 2)  Scooting: Dependent/Total;2 Person assistance (total assist x 2 to scoot back in bedside chair)  Transfers  Sit to Stand: Dependent/Total;2 Person Assistance (max assist x 2)  Stand to sit: Dependent/Total;2 Person Assistance (max assist x 2)  Bed to Chair: Dependent/Total;2 Person Assistance (via stand pivot)  Stand Pivot Transfers: Dependent/Total;2 Person Assistance (max assist x 2 from EOB to bedside chair)        Balance  Sitting - Static: Poor (static EOB sitting x 20 minutes with max assist due to posterior lean and pushing with LUE)  Standing - Dynamic: Poor (max assist x 2 for stand pivot to bedside chair)           OutComes Score                                                  AM-PAC Score  AM-PAC Inpatient Mobility Raw Score : 6 (06/21/22 0937)  AM-PAC Inpatient T-Scale Score : 23.55 (06/21/22 0937)  Mobility Inpatient CMS 0-100% Score: 100 (06/21/22 0937)  Mobility Inpatient CMS G-Code Modifier : CN (06/21/22 6451)          Goals  Short Term Goals  Time Frame for Short term goals: discharge  Short term goal 1: patient will perform bed mobility with moderate assistance  Short term goal 2: patient will perform transfers sit<>stand with moderate assistance  Short term goal 3: patient will perform transfers bed<>chair with moderate assistance  Short term goal 4: patient will sit EOB x 5 minutes with CGA for sitting balance  Patient Goals   Patient goals : none stated       Education  Patient Education  Education Given To: Patient  Education Provided: Role of Therapy  Education Method: Verbal  Barriers to Learning: Cognition  Education Outcome: Continued education needed      Therapy Time   Individual Concurrent Group Co-treatment   Time In 0843         Time Out 0921         Minutes 38         Timed Code Treatment Minutes: 23 Minutes    Timed Code Treatment Minutes:  23 Minutes    Total Treatment Minutes:    23 minutes treatment + 15 minutes evaluation = 38 total treatment minutes  If patient discharges prior to next session this note will serve as a discharge summary. Please see below for the latest assessment towards goals.    Juanito WHITMAN [address]

## 2022-06-21 NOTE — CARE COORDINATION
Case Management Assessment           Initial Evaluation                Date / Time of Evaluation: 6/21/2022 2:48 PM                 Assessment Completed by: Lexa Kenyon RN    Patient Name: Ben Dietrich     YOB: 1935  Diagnosis: STEMI (ST elevation myocardial infarction) ([facility] [address] [I21.3]  NSTEMI (non-ST elevated myocardial infarction) ([facility] [address] [I21.4]  Altered mental status, unspecified altered mental status type [R41.82]     Date / Time: 6/18/2022 11:26 AM    Patient Admission Status: Inpatient    If patient is discharged prior to next notation, then this note serves as note for discharge by case management. Current PCP: FABRICIO Anthony CNP  Clinic Patient: No    Chart Reviewed: Yes  Patient/ Family Interviewed: No; unable to reach patients daughter Valerie Rodriguez via phone and not present at bedside    Initial assessment completed at bedside with:     Hospitalization in the last 30 days: No    Emergency Contacts:  Extended Emergency Contact Information  Primary Emergency Contact: Angela Yu  Address: [facility] [address], [place] [address] Phone: [phone]  Work Phone: [phone]  Relation: Child  Secondary Emergency Contact: [address] Phone: [phone]  Work Phone: [phone]  Relation: Brother/Sister    Advance Directives:   Code Status: Full [address]: No  Agent:   Contact Number:     Copy present: No     In paper Chart: No    Scanned into EMR No    Financial  Payor: Juan Ramon Olsen / Plan: Sebastien Ledezma / Product Type: *No Product type* /     Pre-cert required for SNF: Yes    Pharmacy  No Pharmacies Listed    Potential assistance Purchasing Medications:    Does Patient want to participate in local refill/ meds to beds program?: Yes    Meds To Beds General Rules:  1. Can ONLY be done Monday- Friday between 8:30am-5pm  2.  Prescription(s) must be in pharmacy by 3pm to be filled same day  3. Copy of patient's insurance/ prescription drug card and patient face sheet must be sent along with the prescription(s)  4. Cost of Rx cannot be added to hospital bill. If financial assistance is needed, please contact unit  or ;  or  CANNOT provide pharmacy voucher for patients co-pays  5.  Patients can then  the prescription on their way out of the hospital at discharge, or pharmacy can deliver to the bedside if staff is available. (payment due at time of pick-up or delivery - cash, check, or card accepted)     Able to afford home medications/ co-pay costs: Yes    ADLS  Support Systems:      PT AM-PAC: 6 /24  OT AM-PAC: 6 /24    [place]: from home with daughter  Steps: 5    Plans to RETURN to current housing: Yes  Barriers to RETURNING to current housing: SNF stay prior to return home    [address]  Currently ACTIVE with [facility] [address] [facility] [place]: No  Home Care Agency: Not Applicable    Currently ACTIVE with [place] on Aging: No  Passport/ Waiver: No  Passport/ Waiver Services: Not Applicable    :  Phone:       [address]  DME Provider: has prior to admission  Equipment: walker    Home Oxygen and [address] prior to admission: No  [address]: Not Applicable  Other Respiratory Equipment:     Informed of need to bring portable home O2 tank on day of DISCHARGE for nursing to connect prior to leaving: No  Verbalized agreement/Understanding: No  Person to bring portable tank at discharge:     Dialysis  Active with HD/PD prior to admission: No  Nephrologist:     HD Center:  Not Applicable    DISCHARGE PLAN:  Disposition: [place] (SNF): TBD Phone: TBD Fax: TBD    Transportation PLAN for discharge: EMS transportation     Factors facilitating achievement of predicted outcomes: Family support    Barriers to discharge: Medication managment and feeding plan    Additional Case Management Notes: CM has attempted to reach patients daughter today and yesterday, unable to reach via phone and have not seen her at bedside for DC planning discussion. PC NP Bette De spoke with daughter and she is interested in SNF for patient at DC, per discussion yesterday. Patient has been to SNF at [facility] in the past, per chart review, but it has been many years since she has been in a facility. Patient will need a long term feeding plan. CM discussed with MD. CM will make referral to [facility] for review and once able to speak with daughter will provide additional SNF choices for review. CM will leave SNF list in room, in case daughter comes to see patient when CM not here. The Plan for Transition of Care is related to the following treatment goals of STEMI (ST elevation myocardial infarction) ([facility] [address] [I21.3]  NSTEMI (non-ST elevated myocardial infarction) ([facility] [address] [I21.4]  Altered mental status, unspecified altered mental status type [R41.82]    The Patient and/or patient representative Mingo and her family were provided with a choice of provider and agrees with the discharge plan Yes    Freedom of choice list was provided with basic dialogue that supports the patient's individualized plan of care/goals and shares the quality data associated with the providers.  Yes    Care Transition patient: No    Babar Todd RN  [facility]  Case Management Department  Ph: [phone]   Fax: [phone]

## 2022-06-22 ENCOUNTER — APPOINTMENT (OUTPATIENT)
Dept: GENERAL RADIOLOGY | Age: 87
DRG: 682 | End: 2022-06-22
Payer: COMMERCIAL

## 2022-06-22 LAB
ALBUMIN SERPL-MCNC: 3.3 G/DL (ref 3.4–5)
ALP BLD-CCNC: 65 U/L (ref 40–129)
ALT SERPL-CCNC: 456 U/L (ref 10–40)
ANION GAP SERPL CALCULATED.3IONS-SCNC: 13 MMOL/L (ref 3–16)
AST SERPL-CCNC: 265 U/L (ref 15–37)
BASOPHILS ABSOLUTE: 0 K/UL (ref 0–0.2)
BASOPHILS RELATIVE PERCENT: 0 %
BILIRUB SERPL-MCNC: 0.6 MG/DL (ref 0–1)
BILIRUBIN DIRECT: <0.2 MG/DL (ref 0–0.3)
BILIRUBIN, INDIRECT: ABNORMAL MG/DL (ref 0–1)
BLOOD CULTURE, ROUTINE: NORMAL
BUN BLDV-MCNC: 33 MG/DL (ref 7–20)
CALCIUM SERPL-MCNC: 10.5 MG/DL (ref 8.3–10.6)
CHLORIDE BLD-SCNC: 105 MMOL/L (ref 99–110)
CO2: 19 MMOL/L (ref 21–32)
CREAT SERPL-MCNC: 1.3 MG/DL (ref 0.6–1.2)
EKG ATRIAL RATE: 76 BPM
EKG DIAGNOSIS: NORMAL
EKG P AXIS: 67 DEGREES
EKG P-R INTERVAL: 128 MS
EKG Q-T INTERVAL: 378 MS
EKG QRS DURATION: 76 MS
EKG QTC CALCULATION (BAZETT): 425 MS
EKG R AXIS: 62 DEGREES
EKG T AXIS: 52 DEGREES
EKG VENTRICULAR RATE: 76 BPM
EOSINOPHILS ABSOLUTE: 0.2 K/UL (ref 0–0.6)
EOSINOPHILS RELATIVE PERCENT: 1 %
GFR AFRICAN AMERICAN: 47
GFR NON-AFRICAN AMERICAN: 39
GLUCOSE BLD-MCNC: 10 MG/DL (ref 70–99)
GLUCOSE BLD-MCNC: 116 MG/DL (ref 70–99)
GLUCOSE BLD-MCNC: 116 MG/DL (ref 70–99)
GLUCOSE BLD-MCNC: 12 MG/DL (ref 70–99)
GLUCOSE BLD-MCNC: 33 MG/DL (ref 70–99)
GLUCOSE BLD-MCNC: 35 MG/DL (ref 70–99)
GLUCOSE BLD-MCNC: 93 MG/DL (ref 70–99)
GLUCOSE BLD-MCNC: 98 MG/DL (ref 70–99)
HCT VFR BLD CALC: 29.5 % (ref 36–48)
HEMOGLOBIN: 9.6 G/DL (ref 12–16)
LYMPHOCYTES ABSOLUTE: 1.8 K/UL (ref 1–5.1)
LYMPHOCYTES RELATIVE PERCENT: 12 %
MAGNESIUM: 2.1 MG/DL (ref 1.8–2.4)
MCH RBC QN AUTO: 29.2 PG (ref 26–34)
MCHC RBC AUTO-ENTMCNC: 32.5 G/DL (ref 31–36)
MCV RBC AUTO: 89.8 FL (ref 80–100)
MONOCYTES ABSOLUTE: 1.2 K/UL (ref 0–1.3)
MONOCYTES RELATIVE PERCENT: 8 %
MYELOCYTE PERCENT: 1 %
NEUTROPHILS ABSOLUTE: 12.1 K/UL (ref 1.7–7.7)
NEUTROPHILS RELATIVE PERCENT: 78 %
PDW BLD-RTO: 14.7 % (ref 12.4–15.4)
PERFORMED ON: ABNORMAL
PERFORMED ON: NORMAL
PLATELET # BLD: 179 K/UL (ref 135–450)
PMV BLD AUTO: 10.3 FL (ref 5–10.5)
POTASSIUM SERPL-SCNC: 4.3 MMOL/L (ref 3.5–5.1)
PROCALCITONIN: 1.32 NG/ML (ref 0–0.15)
RBC # BLD: 3.29 M/UL (ref 4–5.2)
SLIDE REVIEW: ABNORMAL
SODIUM BLD-SCNC: 137 MMOL/L (ref 136–145)
TOTAL CK: 979 U/L (ref 26–192)
TOTAL PROTEIN: 6.5 G/DL (ref 6.4–8.2)
WBC # BLD: 15.3 K/UL (ref 4–11)

## 2022-06-22 PROCEDURE — 51798 US URINE CAPACITY MEASURE: CPT

## 2022-06-22 PROCEDURE — 87641 MR-STAPH DNA AMP PROBE: CPT

## 2022-06-22 PROCEDURE — 82550 ASSAY OF CK (CPK): CPT

## 2022-06-22 PROCEDURE — 82947 ASSAY GLUCOSE BLOOD QUANT: CPT

## 2022-06-22 PROCEDURE — 99233 SBSQ HOSP IP/OBS HIGH 50: CPT | Performed by: INTERNAL MEDICINE

## 2022-06-22 PROCEDURE — 36415 COLL VENOUS BLD VENIPUNCTURE: CPT

## 2022-06-22 PROCEDURE — 80076 HEPATIC FUNCTION PANEL: CPT

## 2022-06-22 PROCEDURE — 99232 SBSQ HOSP IP/OBS MODERATE 35: CPT | Performed by: NURSE PRACTITIONER

## 2022-06-22 PROCEDURE — 87449 NOS EACH ORGANISM AG IA: CPT

## 2022-06-22 PROCEDURE — 94760 N-INVAS EAR/PLS OXIMETRY 1: CPT

## 2022-06-22 PROCEDURE — 85025 COMPLETE CBC W/AUTO DIFF WBC: CPT

## 2022-06-22 PROCEDURE — 80048 BASIC METABOLIC PNL TOTAL CA: CPT

## 2022-06-22 PROCEDURE — 92611 MOTION FLUOROSCOPY/SWALLOW: CPT

## 2022-06-22 PROCEDURE — 92526 ORAL FUNCTION THERAPY: CPT

## 2022-06-22 PROCEDURE — 2580000003 HC RX 258: Performed by: STUDENT IN AN ORGANIZED HEALTH CARE EDUCATION/TRAINING PROGRAM

## 2022-06-22 PROCEDURE — 84145 PROCALCITONIN (PCT): CPT

## 2022-06-22 PROCEDURE — 74230 X-RAY XM SWLNG FUNCJ C+: CPT

## 2022-06-22 PROCEDURE — 2060000000 HC ICU INTERMEDIATE R&B

## 2022-06-22 PROCEDURE — 83735 ASSAY OF MAGNESIUM: CPT

## 2022-06-22 RX ADMIN — SODIUM CHLORIDE, PRESERVATIVE FREE 10 ML: 5 INJECTION INTRAVENOUS at 08:20

## 2022-06-22 RX ADMIN — DEXTROSE MONOHYDRATE 125 ML: 10 INJECTION, SOLUTION INTRAVENOUS at 08:47

## 2022-06-22 ASSESSMENT — PAIN SCALES - GENERAL
PAINLEVEL_OUTOF10: 0

## 2022-06-22 ASSESSMENT — PAIN SCALES - PAIN ASSESSMENT IN ADVANCED DEMENTIA (PAINAD)
BODYLANGUAGE: 0
FACIALEXPRESSION: 0
CONSOLABILITY: 0
NEGVOCALIZATION: 0
BREATHING: 0
TOTALSCORE: 0

## 2022-06-22 ASSESSMENT — PAIN SCALES - WONG BAKER: WONGBAKER_NUMERICALRESPONSE: 0

## 2022-06-22 NOTE — CARE COORDINATION
16:00pm Addendum: Referral made to [facility], they can accept and started precert, pt is eating per SLP abner.             Case Management Assessment           Initial Evaluation                Date / Time of Evaluation: 6/22/2022 10:04 AM                 Assessment Completed by: Rich Farnsworth RN    Patient Name: Antonio Mackey     YOB: 1935  Diagnosis: STEMI (ST elevation myocardial infarction) ([facility] [address] [I21.3]  NSTEMI (non-ST elevated myocardial infarction) ([facility] [address] [I21.4]  Altered mental status, unspecified altered mental status type [R41.82]     Date / Time: 6/18/2022 11:26 AM    Patient Admission Status: Inpatient    If patient is discharged prior to next notation, then this note serves as note for discharge by case management. Current PCP: Naeem Villa [address] [place] [address] Patient: No    Chart Reviewed: Yes  Patient/ Family Interviewed: Yes    Initial assessment completed at bedside with: daughter over the phone     Hospitalization in the last 30 days: No    Emergency Contacts:  Extended Emergency Contact Information  Primary Emergency Contact: Angela Yu  Address: [address], [place] [address] Phone: [phone]  Work Phone: [phone]  Relation: Child  Secondary Emergency Contact: [address] Phone: [phone]  Work Phone: [phone]  Relation: Brother/Sister    Advance Directives:   Code Status: Full [address]: No    Financial  Payor: Kimi / Plan: Ford Mckeon / Product Type: *No Product type* /     Pre-cert required for SNF: Yes    Pharmacy  No Pharmacies Listed    Potential assistance Purchasing Medications:    Does Patient want to participate in local refill/ meds to beds program?: Yes    Meds To Beds General Rules:  1. Can ONLY be done Monday- Friday between 8:30am-5pm  2. Prescription(s) must be in pharmacy by 3pm to be filled same day  3. Copy of patient's insurance/ prescription drug card and patient face sheet must be sent along with the prescription(s)  4. Cost of Rx cannot be added to hospital bill. If financial assistance is needed, please contact unit  or ;  or  CANNOT provide pharmacy voucher for patients co-pays  5. Patients can then  the prescription on their way out of the hospital at discharge, or pharmacy can deliver to the bedside if staff is available. (payment due at time of pick-up or delivery - cash, check, or card accepted)     Able to afford home medications/ co-pay costs: Yes    ADLS  Support Systems:      PT AM-PAC: 6 /24  OT AM-PAC: 6 /24    [place]: From home with daughter in one level apt  Steps: 5 steps to enter with railing    Plans to RETURN to current housing: No  Barriers to RETURNING to current housing: medical complications. Home Care Information  Currently ACTIVE with Home Health Care: No  Home Care Agency: Not Applicable    Currently ACTIVE with [place] on Aging: No  Passport/ Waiver: No  Passport/ Waiver Services: Not Applicable      Durable Medical Equipment  DME Provider: n/a  Equipment: walker    Home Oxygen and Respiratory Equipment  Has HOME OXYGEN prior to admission: No  Oxygen Company: Not Applicable    DISCHARGE PLAN:  Disposition: [facility]    Transportation PLAN for discharge: EMS transportation     Factors facilitating achievement of predicted outcomes: Family support and Caregiver support    Barriers to discharge: Medical complications and Medication managment    Additional Case Management Notes: Pt is from home with daughter, uses walker. Pt has hx of dementia but able to do some self care and walk slowly with walker. Pt is currently below baseline. CM spoke with Antonio Rodriguez, pt's daughter who would like pt to go to [facility] at discharge. Precert needed.      The Plan for Transition of Care is related to the following treatment goals of STEMI (ST elevation myocardial infarction) ([facility] [address] [I21.3]  NSTEMI (non-ST elevated myocardial infarction) ([facility] [address] [I21.4]  Altered mental status, unspecified altered mental status type [R41.82]    The Patient and/or patient representative Mingo and her family were provided with a choice of provider and agrees with the discharge plan Yes    Freedom of choice list was provided with basic dialogue that supports the patient's individualized plan of care/goals and shares the quality data associated with the providers.  Yes    Care Transition patient: No    Lawanda Engel RN  [facility]  Case Management Department  Ph: [phone]   Fax: [phone]

## 2022-06-22 NOTE — PROGRESS NOTES
Speech Language Pathology    Completed modified barium swallow; full report to follow. Briefly, no aspiration observed. Recommend thin liquids via straw and pureed solids. D/w Flash Felix. Kevon Mckee Runkelen, [id number]  Pg.  # [id number]

## 2022-06-22 NOTE — PLAN OF CARE
Problem: Discharge Planning  Goal: Discharge to home or other facility with appropriate resources  6/22/2022 1712 by George Duffy RN  Outcome: Progressing  Flowsheets (Taken 6/21/2022 1600 by Zeus Lewis RN)  Discharge to home or other facility with appropriate resources: Identify barriers to discharge with patient and caregiver  Note: Pt to discharge home or SNF upon discharge. Pt needs to stay in PCU for DM II management at this time  6/22/2022 2061 by Mary Asif RN  Outcome: Progressing     Problem: Pain  Goal: Verbalizes/displays adequate comfort level or baseline comfort level  6/22/2022 1712 by George Duffy RN  Outcome: Progressing  Flowsheets (Taken 6/21/2022 1533 by Zeus Lewis RN)  Verbalizes/displays adequate comfort level or baseline comfort level:   Encourage patient to monitor pain and request assistance   Assess pain using appropriate pain scale   Administer analgesics based on type and severity of pain and evaluate response   Implement non-pharmacological measures as appropriate and evaluate response  Note: Pt has pain with movement.  Denies wanting any meds  6/22/2022 8218 by Mary Asif RN  Outcome: Progressing     Problem: Safety - Adult  Goal: Free from fall injury  6/22/2022 1712 by George Duffy RN  Outcome: Progressing  6/22/2022 0624 by Mary Asif RN  Outcome: Progressing     Problem: Neurosensory - Adult  Goal: Achieves stable or improved neurological status  6/22/2022 1712 by George Duffy RN  Outcome: Progressing  6/22/2022 0624 by Mary Asif RN  Outcome: Progressing     Problem: Cardiovascular - Adult  Goal: Absence of cardiac dysrhythmias or at baseline  6/22/2022 1712 by George Duffy RN  Outcome: Progressing  6/22/2022 0624 by Mary Asif RN  Outcome: Progressing  Goal: Maintains optimal cardiac output and hemodynamic stability  6/22/2022 1712 by George Duffy RN  Outcome: Progressing  6/22/2022 0624 by Mary Asif RN  Outcome: Progressing     Problem: Respiratory - Adult  Goal: Achieves optimal ventilation and oxygenation  6/22/2022 1712 by Svitlana Anders RN  Outcome: Progressing  6/22/2022 0624 by Karen Garcia RN  Outcome: Progressing     Problem: Genitourinary - Adult  Goal: Urinary catheter remains patent  6/22/2022 1712 by Svitlana Anders RN  Outcome: Progressing  6/22/2022 0624 by Karen Garcia RN  Outcome: Progressing     Problem: Metabolic/Fluid and Electrolytes - Adult  Goal: Electrolytes maintained within normal limits  6/22/2022 1712 by Svitlana Anders RN  Outcome: Progressing  6/22/2022 0624 by Karen Garcia RN  Outcome: Progressing     Problem: Skin/Tissue Integrity - Adult  Goal: Skin integrity remains intact  6/22/2022 1712 by Svitlana Anders RN  Outcome: Progressing  6/22/2022 0624 by Karen Garcia RN  Outcome: Progressing     Problem: Musculoskeletal - Adult  Goal: Return mobility to safest level of function  6/22/2022 1712 by Svitlana Anders RN  Outcome: Progressing  6/22/2022 0624 by Karen Garcia RN  Outcome: Progressing     Problem: Skin/Tissue Integrity  Goal: Absence of new skin breakdown  Description: 1. Monitor for areas of redness and/or skin breakdown  2. Assess vascular access sites hourly  3. Every 4-6 hours minimum:  Change oxygen saturation probe site  4. Every 4-6 hours:  If on nasal continuous positive airway pressure, respiratory therapy assess nares and determine need for appliance change or resting period.   6/22/2022 1712 by Svitlana Anders RN  Outcome: Progressing  6/22/2022 0624 by Karen Garcia RN  Outcome: Progressing     Problem: ABCDS Injury Assessment  Goal: Absence of physical injury  6/22/2022 1712 by Svitlana Anders RN  Outcome: Progressing  6/22/2022 0624 by Karen Garcia RN  Outcome: Progressing     Problem: Chronic Conditions and Co-morbidities  Goal: Patient's chronic conditions and co-morbidity symptoms are monitored and maintained or improved  6/22/2022 1712 by Reji Lazar RN  Outcome: Progressing  6/22/2022 0624 by Fernanda Lezama RN  Outcome: Progressing

## 2022-06-22 NOTE — PROGRESS NOTES
Pt POC blood sugar this evening was 12 on right hand and rechecked it was 10. Dr. Iam Deluca and charge RN Kia Vera aware. Pt was awake and able to eat about 80% of supper. Lab was here to draw routine at 1740. It seems that the POC fingersticks are not accurate and we will rely on the lab draws. No new orders at this time.

## 2022-06-22 NOTE — PROGRESS NOTES
Speech Language Pathology  Facility/Department: [place] [address] [facility]  Dysphagia Daily Treatment Note    NAME: Celestino Mancia  : 1935  MRN: [id number]    Patient Diagnosis(es):   Patient Active Problem List    Diagnosis Date Noted    Generalized weakness     Encounter for palliative care     Advance care planning     Altered mental status     [facility]     Electrolyte imbalance     Chronic combined systolic and diastolic CHF (congestive heart failure) ([facility] 2013    Syncope 2013    Bradycardia 2013    Chronic kidney disease (CKD), stage III (moderate) (HCC) 2013    Dementia ([facility] 2013    NSTEMI (non-ST elevated myocardial infarction) ([facility] 2013     Allergies: No Known Allergies  Onset Date: 2022    CXR (2022)-        No acute cardiopulmonary findings.            CT Chest: (2022)  Impression       1. Bilateral lower lobe airspace disease, moderate on the right and mild on left. MRI Brain: (2022)    Impression       1. No evidence of early ischemic injury. 2. Mild cortical atrophy. 3. Moderately severe periventricular white matter disease.         Previous MBS (n/a)    Chart reviewed. Medical Diagnosis: STEMI (ST elevation myocardial infarction) ([facility] [I21.3]  NSTEMI (non-ST elevated myocardial infarction) ([facility] [I21.4]  Altered mental status, unspecified altered mental status type [R41.82]   Treatment Diagnosis: Dysphagia    BSE Impression (2022)-  Dysphagia Impression : Pt presents with high aspiration risk at this time secondary to mental status; She demonstrated delayed swallow/ oral bolus holding despite cueing to swallow. Delayed coughing noted at conclusion of trials. Recommend NPO at this time with ice chips after oral care; If pt needs PO medications, they may be crushed with puree (RN supervision and cues to ensure swallow).  SLP to follow and determine safety of diet initiation as AMS present  6/22: educated pt to purpose of visit, swallow function/concerns, MBS recommendation. Questionable comprehension, no family present. Cont goal    Patient/Family/Caregiver Education:  See above    Compensatory Strategies:  Compensatory Swallowing Strategies : Total feed      Plan  Diet Recommendations:      - TBD pending MBS (planned for today)    Discharge Plan:  TBD  Discussed with RN, Padmaja  Needs within reach. Electronically Signed by:  Dana Pino M.A., Tina Sunshine   Speech-Language Pathologist  Pager #[phone]    This document will serve as a discharge summary if pt discharges before next treatment.

## 2022-06-22 NOTE — PROGRESS NOTES
Progress Note    Admit Date: 6/18/2022  Day: 5  Diet: Diet NPO Exceptions are: Ice Chips    CC: AMS    Interval history: NAEO, Pt is more awake this morning. She is able to state her name and place, she knows she is in the hospital bc she got sick. She stated that the month was May. Follows commands, can squeeze fingers and move feet. SLP at bedside. HPI: Neal padillaa 81 yo F with PMH of demenita, HLD, CAD, CKD, HFrEF Who presents from home. For AMS, pt sitting at home was slumped over in her chair and brought to the ED. Pt was altered on arrival, non-verbal but opens eyes and responds to pain.     In the ED EKG showed ST elevation in lateral and inferior leads code STEMI activated but pt did not get any intervention due to being altered. Pt afebrile, HDS. Pro-BNP elevated 7360, Troponin elevated 0.70, MIGUELITO 3.3. WBC 14.0, Hgb 7.7, platelet 60, pT 85.4, INR 1.28, aPTT 20.7. U/A cloudy, specific gravity >1.030, Large blood, protein, 10-20 WBC 3+ bacteria. CTH negative for any intracranial abnormality. CXR No acute cardiopulmonary findings.     Pt admitted for further work-up of AMS.      Medications:     Scheduled Meds:   cefTRIAXone (ROCEPHIN) IV  1,000 mg IntraVENous Q24H    lidocaine 1 % injection  5 mL IntraDERmal Once    sodium chloride flush  5-40 mL IntraVENous 2 times per day    sodium chloride flush  5-40 mL IntraVENous 2 times per day     Continuous Infusions:   sodium chloride      sodium chloride      sodium chloride Stopped (06/21/22 1824)    dextrose 100 mL/hr (06/19/22 0814)     PRN Meds:sodium chloride, sodium chloride flush, sodium chloride, sodium chloride flush, sodium chloride, ondansetron **OR** ondansetron, acetaminophen **OR** acetaminophen, polyethylene glycol, glucose, dextrose bolus **OR** dextrose bolus, glucagon (rDNA), dextrose, perflutren lipid microspheres    Objective:   Vitals:   T-max:  Patient Vitals for the past 8 hrs:   BP Temp Temp src Pulse Resp Weight   06/22/22 6394 -- -- -- -- -- 100 lb 15.5 oz (45.8 kg)   06/22/22 0509 123/68 97 °F (36.1 °C) Axillary 64 18 --       Intake/Output Summary (Last 24 hours) at 6/22/2022 1000  Last data filed at 6/22/2022 0511  Gross per 24 hour   Intake 898.89 ml   Output 310 ml   Net 588.89 ml       Review of Systems    Physical Exam  Constitutional:       Appearance: She is ill-appearing. HENT:      Head: Normocephalic and atraumatic. Mouth/Throat:      Mouth: Mucous membranes are moist.   Cardiovascular:      Rate and Rhythm: Normal rate and regular rhythm. Pulses: Normal pulses. Heart sounds: Normal heart sounds. Pulmonary:      Effort: Pulmonary effort is normal. No respiratory distress. Abdominal:      General: Abdomen is flat. Palpations: Abdomen is soft. Musculoskeletal:         General: Normal range of motion. Skin:     General: Skin is warm and dry. Neurological:      General: No focal deficit present. Psychiatric:         Mood and Affect: Mood normal.         LABS:    CBC:   Recent Labs     06/19/22  1252 06/19/22  1252 06/20/22  0337 06/20/22  1847 06/21/22  0439   WBC 12.2*  --  12.4*  --  12.5*   HGB 7.3*   < > 6.7* 10.2* 8.4*   HCT 22.4*   < > 20.7* 30.8* 24.9*   PLT 41*  --  39*  --  73*   MCV 92.4  --  93.5  --  87.8    < > = values in this interval not displayed.      Renal:    Recent Labs     06/20/22  0337 06/20/22  0337 06/20/22  1600 06/20/22  1600 06/21/22  0439 06/21/22  1450 06/21/22  1906 06/21/22 2032 06/22/22  0544      < > 135*  --  138  --   --   --  137   K 4.0   < > 4.4  --  3.8  --   --   --  4.3      < > 105  --  107  --   --   --  105   CO2 19*   < > 17*  --  20*  --   --   --  19*   BUN 45*   < > 41*  --  36*  --   --   --  33*   CREATININE 2.0*   < > 1.5*  --  1.4*  --   --   --  1.3*   GLUCOSE 106*   < > 98   < > 103*   < > 424* 91 93   CALCIUM 9.8   < > 9.9  --  10.1  --   --   --  10.5   MG 2.50*  --   --   --  2.00  --   --   --  2.10   ANIONGAP 11   < > 13 --  11  --   --   --  13    < > = values in this interval not displayed. Hepatic:   Recent Labs     06/20/22  0337 06/21/22  0439 06/22/22  0544   * 377* 265*   * 527* 456*   BILITOT 0.7 0.6 0.6   BILIDIR <0.2 <0.2 <0.2   PROT 6.3* 6.2* 6.5   LABALBU 3.0* 3.1* 3.3*   ALKPHOS 60 77 65     Troponin:   Recent Labs     06/19/22  1932 06/20/22  0337 06/20/22  1600   TROPONINI 0.46* 0.40*  0.38* 0.30*     BNP: No results for input(s): BNP in the last 72 hours. Lipids:   No results for input(s): CHOL, HDL in the last 72 hours. Invalid input(s): LDLCALCU, TRIGLYCERIDE  ABGs:  No results for input(s): PHART, DTJ4JYZ, PO2ART, ICR8WZC, BEART, THGBART, B6VARSYI, RQT0IGZ in the last 72 hours. INR:   No results for input(s): INR in the last 72 hours. Lactate: No results for input(s): LACTATE in the last 72 hours. Cultures:  -----------------------------------------------------------------  RAD:   US ABDOMEN LIMITED   Final Result      1. Normal ultrasound right upper quadrant abdomen. MRI BRAIN WO CONTRAST   Final Result      1. No evidence of early ischemic injury. 2. Mild cortical atrophy. 3. Moderately severe periventricular white matter disease. CT CHEST WO CONTRAST   Final Result      1. Bilateral lower lobe airspace disease, moderate on the right and mild on left. CT ABDOMEN PELVIS WO CONTRAST Additional Contrast? None   Final Result      No acute abdominopelvic abnormality. INCIDENTAL FINDINGS:      XR CHEST PORTABLE   Final Result      No acute cardiopulmonary findings. CT HEAD WO CONTRAST   Final Result      Cerebral atrophy. Evidence of diffuse chronic small vessel white matter disease bilaterally. No acute intracranial findings. FL MODIFIED BARIUM SWALLOW W VIDEO    (Results Pending)       Assessment/Plan:     Acute Metabolic Encephalopathy-improving  Leukocytosis-improving  Initially was only responsive to pain.  Pt has baseline dementia  Likely secondary to infection, POA White count 14-->18, CTH no acute findings, CXR no acute findings, Procal 18.8. U/A with WBC 10-20 and 4+ bacteria. Empircally started on broad spectrum antibiotics of vanc and cefepime  - CTAP (6/18): no acute findings  - CT Chest (6/19): bilateral lower lobe airspace ds R>L  - MRI Brain (6/19): No evidence of early ischemic injury, mild cortical atrophy, mod severe periventricular white matter disease  - ammonia 34, WNL  - Blood cx NGTD  - Urine cx: E. Coli, sensitive to ceftriaxone  - MRSA swab negative, d/c vanc  - d/c cefepime (6/18-6/21) cont ceftriaxone (6/22)  - palliative care consult    Dysphagia  - SLP following, plan for MBS today, swallowing better, delayed cough  - continue NPO for now    Hypoglycemia  POCT glucose are likely inaccurate, pt fingers are cold. - pt getting D10 intermittently  - serum glucose checks q12    CK elevation  CK 6133--> 1637-->979  - fluids as above    MIGUELITO on CKD3a?-improving  Cr 3.3, POA.  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, similar to previous EKGs  - ECHO EF 77-03%, normal diastolic function    Acute on Chronic normocytic anemia  Iron studies WNL  Vitamin B12 and folate elevated  - transfuse 1u pRBCs (6/20)    Transaminitis-improving  , 550 today, possibly due to fluctuations in BP  - hepatitis panel negative  - LFTs trending down  - RUQ U/S WNL    Thrombocytopenia- improving  Likely 2/2 infection  - platelets improving  - f/u peripheral smear    Code Status:FULL  FEN: Diet NPO Exceptions are: Ice Chips  PPX: SCD  DISPO: Chloe Jauregui MD, PGY-1  06/22/22  10:00 AM    This patient has been staffed and discussed with Daniel Rice MD.

## 2022-06-22 NOTE — PROCEDURES
further work-up of AMS. '  Subjective  Subjective: Received pt awake, alert, resting in bed, on room air. Behavior/Cognition/Vision/Hearing:  Behavior/Cognition: Alert;Confused;Pleasant mood  Vision: Impaired  Hearing: Exceptions to [facility]  Hearing Exceptions: Hard of hearing/hearing concerns    Impressions:  Oral phase: mild-moderate oral phase dysphagia characterized by prolonged oral prep and slowed a-p transit    Pharyngeal phase: functional mild dysphagia characterized by delayed swallow onset with premature spillage of liquids to valleculae/pyriforms. Adequate hyolaryngeal mechanics with no aspiration visualized. Single instance of penetration of residue observed. Upper Esophageal Screen: Indirectly assessed, appeared grossly WF    Dysphagia Outcome Severity Scale: Level 5: Mild dysphagia- Distant supervision. May need one diet consistency restricted  Penetration-Aspiration Scale (PAS): 2 - Material enters the airway, remains above the vocal folds, and is ejected from the airway    Treatment Dx and ICD 10: dysphagia   Patient Position: Lateral and upright (pt with head turned to right (baseline))    Recommended Diet:  Solid consistency: Pureed  Liquid consistency: Thin  Liquid administration via: Straw    Medication administration: Meds in puree    Safe Swallow Protocol:  Supervision: 1:1  Compensatory Swallowing Strategies : Total feed;Upright as possible for all oral intake;Eat/Feed slowly; Alternate solids and liquids;Small bites/sips;Assist feed; External pacing      Recommendations/Treatment  Requires SLP Intervention: Yes        D/C Recommendations: Ongoing speech therapy is recommended during this hospitalization       Recommended Exercises:    Therapeutic Interventions: Diet tolerance monitoring;Patient/Family education;Oral care    Education: Images and recommendations were reviewed with the patient following this exam.   Patient Education: Educated pt to results recommendations; questionable comprehension. Patient Education Response: Needs reinforcement    Safety Devices  Safety Devices in place: Yes  Type of devices: All fall risk precautions in place; Other (comment) (handed off to radiology department)      Goals:    Goal 1: Pt will participate in dynamic swallow assessment  Goal 2: Pt will initiate oral diet when indicated safe by SLP  Goal 3: Pt will demonstrate understanding of recommendations from SLP      Oral Preparation / Oral Phase  Mild-mod    Pharyngeal Phase  mild    Esophageal Phase  Esophageal Screen: WFL        Pain      Pain Level: 0      Therapy Time:   Individual Concurrent Group Co-treatment   Time In 1120         Time Out 1150         Minutes 30            Timed Code Treatment Minutes: 0 Minutes  Total Treatment Time: 30    Plan  Diet Recommendations:      - puree and thin liquids via straw, meds crushed in puree as able   - 1:1 assist feed    Discharge Plan:  TBD  Discussed with RN, [address]. Needs within reach. Electronically Signed by:  Adams Toscano M.A., Tina Sunshine   Speech-Language Pathologist  Pager #[phone]    This document will serve as a discharge summary if pt discharges before next treatment.

## 2022-06-22 NOTE — PLAN OF CARE
Problem: Discharge Planning  Goal: Discharge to home or other facility with appropriate resources  Outcome: Progressing     Problem: Pain  Goal: Verbalizes/displays adequate comfort level or baseline comfort level  Outcome: Progressing     Problem: Safety - Adult  Goal: Free from fall injury  Outcome: Progressing     Problem: Neurosensory - Adult  Goal: Achieves stable or improved neurological status  Outcome: Progressing     Problem: Cardiovascular - Adult  Goal: Absence of cardiac dysrhythmias or at baseline  Outcome: Progressing  Goal: Maintains optimal cardiac output and hemodynamic stability  Outcome: Progressing     Problem: Skin/Tissue Integrity  Goal: Absence of new skin breakdown  Description: 1. Monitor for areas of redness and/or skin breakdown  2. Assess vascular access sites hourly  3. Every 4-6 hours minimum:  Change oxygen saturation probe site  4. Every 4-6 hours:  If on nasal continuous positive airway pressure, respiratory therapy assess nares and determine need for appliance change or resting period.   Outcome: Progressing

## 2022-06-22 NOTE — CONSULTS
Nephrology Progress Note                                                                                                                                                                                                                                                                                                                                                               Office : [phone]     Fax :[phone]              Patient's Name: Neal Reyes  6/22/2022    History of Present Ilness:    Neal Reyes is a 82 y.o. past medical history significant for CKD, hypertension, hyperlipidemia, cardiomyopathy with LV ejection fraction of 45 to 50%, severe LVH, grade 3 diastolic dysfunction, no major obstructive CAD in 2013, was brought into the hospital via EMS secondary to altered mental status. History is limited from the patient. Cr elevated     Interval History: NAONE. Patient alert to person and time but not place or situation. Unable to provide ROS. 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 No pertinent surgical history. History reviewed. No pertinent family history. has an unknown smoking status. She has never used smokeless tobacco. She reports that she does not drink alcohol and does not use drugs. Allergies:  Patient has no known allergies.     Current Medications:    cefTRIAXone (ROCEPHIN) 1000 mg IVPB in 50 mL D5W minibag, Q24H  0.9 % sodium chloride infusion, PRN  lidocaine PF 1 % injection 5 mL, Once  sodium chloride flush 0.9 % injection 5-40 mL, 2 times per day  sodium chloride flush 0.9 % injection 5-40 mL, PRN  0.9 % sodium chloride infusion, PRN  sodium chloride flush 0.9 % injection 5-40 mL, 2 times per day  sodium chloride flush 0.9 % injection 5-40 mL, 06/20/22  1600 06/21/22  0439 06/22/22  0544   CALCIUM 9.8   < > 9.9 10.1 10.5   MG 2.50*  --   --  2.00 2.10    < > = values in this interval not displayed. Hepatic:   Recent Labs     06/20/22  0337 06/21/22  0439 06/22/22  0544   * 377* 265*   * 527* 456*   BILITOT 0.7 0.6 0.6   ALKPHOS 60 77 65     Troponin:   Recent Labs     06/19/22  1932 06/20/22  0337 06/20/22  1600   TROPONINI 0.46* 0.40*  0.38* 0.30*     BNP: No results for input(s): BNP in the last 72 hours. Lipids:   No results for input(s): CHOL, TRIG, HDL, LDLCALC, LABVLDL in the last 72 hours. ABGs: No results for input(s): PHART, PO2ART, JAD0AJS in the last 72 hours. INR:   No results for input(s): INR in the last 72 hours. UA:  Recent Labs     06/19/22  1745   PHUR 3.0      Urine Microscopic:   No results for input(s): LABCAST, BACTERIA, COMU, HYALCAST, WBCUA, RBCUA, EPIU in the last 72 hours. Urine Culture:   No results for input(s): LABURIN in the last 72 hours. Urine Chemistry:   Recent Labs     06/19/22  1745   LABCREA 55.4  55.5   PROTEINUR 28.00*   NAUR 40             IMAGING:  US ABDOMEN LIMITED   Final Result      1. Normal ultrasound right upper quadrant abdomen. MRI BRAIN WO CONTRAST   Final Result      1. No evidence of early ischemic injury. 2. Mild cortical atrophy. 3. Moderately severe periventricular white matter disease. CT CHEST WO CONTRAST   Final Result      1. Bilateral lower lobe airspace disease, moderate on the right and mild on left. CT ABDOMEN PELVIS WO CONTRAST Additional Contrast? None   Final Result      No acute abdominopelvic abnormality. INCIDENTAL FINDINGS:      XR CHEST PORTABLE   Final Result      No acute cardiopulmonary findings. CT HEAD WO CONTRAST   Final Result      Cerebral atrophy. Evidence of diffuse chronic small vessel white matter disease bilaterally. No acute intracranial findings. Assessment/Plan   1.  MIGUELITO 2. HTN    3. Anemia    4. Acid- base/ Electrolyte imbalance     5.  AMS       Plan   - stop IVF   - Creatinine stable (recent 1.3)  - stop diuretics   - Monitor UO and renal function   - ECHO found normal LV size and normal EF 60-65%  - Urine Protein/Creat ratio 0.5  - ProteinUr 28   CrUr 55.4     Recommend to dose adjust all medications  based on renal functions  Maintain SBP> 90 mmHg   Daily weights   AVOID NSAIDs  Avoid Nephrotoxins  Monitor Intake/Output  Call if significant decrease in urine output       [facility] acting as Kyra Brown MD  Thank you for allowing us to participate in care of [address] [place] [address] free to contact me   [facility] [address]  Office : [phone]  Fax :[phone]

## 2022-06-22 NOTE — PROGRESS NOTES
Physical Therapy/Occupational Therapy    Hold note    Pt currently off floor. Will f/u later pm as time allows or per POC. RN aware.       Lia Mccray, [facility], MOT-OTR/L [id number]

## 2022-06-22 NOTE — PROGRESS NOTES
Palliative Medicine Progress Note    Admit Date: 6/18/2022  Hospital Day:  Hospital Day: 5     CC: AMS  HPI: HPI PMH of dementia, HLD, CAD, CKD, HFrEF EF 45-50% who presented with altered mental status, found with STEMI, MIGUELITO on CKD likely secondary to dehydration, and pneumonia. Recommendations:     1. Goals of Care/Advanced Care planning/Code status: Full Code. Family hopes pt can go to a SNF and then return home. Family is open to PEG if needed. 2. Pain: pt denies pain/discomfort. 3. SOB: denies sob. Breathing comfortably on room air. Being treated for pneumonia. 4. Weakness: dtr reports pt is mostly independent at home but is very slow in completing ADLs, so dtr assists her sometimes. She worked with PT/OT and scored 6 on AMPAC scale. 5. Disposition: will likely need SNF    Subjective:     Scheduled Meds:   cefTRIAXone (ROCEPHIN) IV  1,000 mg IntraVENous Q24H    lidocaine 1 % injection  5 mL IntraDERmal Once    sodium chloride flush  5-40 mL IntraVENous 2 times per day    sodium chloride flush  5-40 mL IntraVENous 2 times per day       Continuous Infusions:   sodium chloride      sodium chloride      sodium chloride Stopped (06/21/22 1824)    dextrose 100 mL/hr (06/19/22 0814)       PRN Meds:sodium chloride, sodium chloride flush, sodium chloride, sodium chloride flush, sodium chloride, ondansetron **OR** ondansetron, acetaminophen **OR** acetaminophen, polyethylene glycol, glucose, dextrose bolus **OR** dextrose bolus, glucagon (rDNA), dextrose, perflutren lipid microspheres    Review of Systems - obtained from pt and dtr  Review of Systems   Constitutional: Positive for fatigue. HENT: Negative. Respiratory: Negative. Cardiovascular: Negative. Gastrointestinal: Negative. Genitourinary: Negative. Musculoskeletal: Negative. Neurological: Positive for weakness.         Lethargy   Psychiatric/Behavioral:        Short term memory loss consistent with early dementia     Performance status 30%    Objective:     Patient Vitals for the past 8 hrs:   BP Temp Temp src Pulse Resp SpO2   06/22/22 0811 124/69 97.6 °F (36.4 °C) Axillary 64 18 96 %     I/O last 3 completed shifts: In: 1034.9 [I.V.:850.5; IV Piggyback:184.5]  Out: 1410 [Urine:1410]  No intake/output data recorded. Physical Exam  Constitutional:       General: She is not in acute distress. HENT:      Head: Normocephalic and atraumatic. Cardiovascular:      Rate and Rhythm: Normal rate and regular rhythm. Pulmonary:      Effort: Pulmonary effort is normal.      Breath sounds: Normal breath sounds. Abdominal:      General: Bowel sounds are normal.      Palpations: Abdomen is soft. Musculoskeletal:         General: No swelling. Skin:     General: Skin is warm and dry. Neurological:      Mental Status: She is oriented to person, place, and time. WBC/Hgb/Hct/Plts:  12.5/8.4/24.9/73 (06/21 0439)           Assessment:     Principal Problem:    NSTEMI (non-ST elevated myocardial infarction) ([facility]  Active Problems:    Altered mental status    MIGUELITO (acute kidney injury) ([facility]    Electrolyte imbalance    Generalized weakness    Encounter for palliative care    Advance care planning  Resolved Problems:    * No resolved hospital problems. *    Pt [id number], Family [phone]  Time spent with patient and/or family: 13  Time reviewing records: 5  Time communicating with providers: 10    A total of 30 minutes spent with the patient and family on unit greater than 50% face to face time in counseling regarding palliative care and goals of care for the patient.      James James NP  [address]

## 2022-06-23 LAB
ALBUMIN SERPL-MCNC: 2.9 G/DL (ref 3.4–5)
ALP BLD-CCNC: 69 U/L (ref 40–129)
ALT SERPL-CCNC: 301 U/L (ref 10–40)
ANION GAP SERPL CALCULATED.3IONS-SCNC: 15 MMOL/L (ref 3–16)
AST SERPL-CCNC: 178 U/L (ref 15–37)
BASOPHILS ABSOLUTE: 0 K/UL (ref 0–0.2)
BASOPHILS RELATIVE PERCENT: 0 %
BILIRUB SERPL-MCNC: 0.4 MG/DL (ref 0–1)
BILIRUBIN DIRECT: <0.2 MG/DL (ref 0–0.3)
BILIRUBIN, INDIRECT: ABNORMAL MG/DL (ref 0–1)
BUN BLDV-MCNC: 31 MG/DL (ref 7–20)
CALCIUM SERPL-MCNC: 10.9 MG/DL (ref 8.3–10.6)
CHLORIDE BLD-SCNC: 102 MMOL/L (ref 99–110)
CO2: 18 MMOL/L (ref 21–32)
CREAT SERPL-MCNC: 1.2 MG/DL (ref 0.6–1.2)
EOSINOPHILS ABSOLUTE: 0.1 K/UL (ref 0–0.6)
EOSINOPHILS RELATIVE PERCENT: 1 %
GFR AFRICAN AMERICAN: 52
GFR NON-AFRICAN AMERICAN: 43
GLUCOSE BLD-MCNC: 103 MG/DL (ref 70–99)
GLUCOSE BLD-MCNC: 128 MG/DL (ref 70–99)
GLUCOSE BLD-MCNC: 14 MG/DL (ref 70–99)
GLUCOSE BLD-MCNC: 76 MG/DL (ref 70–99)
GLUCOSE BLD-MCNC: 80 MG/DL (ref 70–99)
GLUCOSE BLD-MCNC: 99 MG/DL (ref 70–99)
HCT VFR BLD CALC: 29.6 % (ref 36–48)
HEMOGLOBIN: 9.7 G/DL (ref 12–16)
L. PNEUMOPHILA SEROGP 1 UR AG: NORMAL
LYMPHOCYTES ABSOLUTE: 0.6 K/UL (ref 1–5.1)
LYMPHOCYTES RELATIVE PERCENT: 4.7 %
MAGNESIUM: 2 MG/DL (ref 1.8–2.4)
MCH RBC QN AUTO: 29.4 PG (ref 26–34)
MCHC RBC AUTO-ENTMCNC: 32.8 G/DL (ref 31–36)
MCV RBC AUTO: 89.5 FL (ref 80–100)
MONOCYTES ABSOLUTE: 2.2 K/UL (ref 0–1.3)
MONOCYTES RELATIVE PERCENT: 18 %
MRSA SCREEN RT-PCR: NORMAL
NEUTROPHILS ABSOLUTE: 9.5 K/UL (ref 1.7–7.7)
NEUTROPHILS RELATIVE PERCENT: 76.3 %
PDW BLD-RTO: 14.6 % (ref 12.4–15.4)
PERFORMED ON: ABNORMAL
PERFORMED ON: NORMAL
PHOSPHORUS: 3.3 MG/DL (ref 2.5–4.9)
PLATELET # BLD: 204 K/UL (ref 135–450)
PMV BLD AUTO: 10.5 FL (ref 5–10.5)
POTASSIUM SERPL-SCNC: 4.2 MMOL/L (ref 3.5–5.1)
POTASSIUM SERPL-SCNC: 5.5 MMOL/L (ref 3.5–5.1)
RBC # BLD: 3.31 M/UL (ref 4–5.2)
SODIUM BLD-SCNC: 135 MMOL/L (ref 136–145)
STREP PNEUMONIAE ANTIGEN, URINE: NORMAL
TOTAL CK: 1012 U/L (ref 26–192)
TOTAL CK: 1074 U/L (ref 26–192)
TOTAL PROTEIN: 7.6 G/DL (ref 6.4–8.2)
TRANSFERRIN: 125 MG/DL (ref 200–360)
WBC # BLD: 12.4 K/UL (ref 4–11)

## 2022-06-23 PROCEDURE — 36415 COLL VENOUS BLD VENIPUNCTURE: CPT

## 2022-06-23 PROCEDURE — 2060000000 HC ICU INTERMEDIATE R&B

## 2022-06-23 PROCEDURE — 80076 HEPATIC FUNCTION PANEL: CPT

## 2022-06-23 PROCEDURE — 6360000002 HC RX W HCPCS: Performed by: STUDENT IN AN ORGANIZED HEALTH CARE EDUCATION/TRAINING PROGRAM

## 2022-06-23 PROCEDURE — 6370000000 HC RX 637 (ALT 250 FOR IP): Performed by: STUDENT IN AN ORGANIZED HEALTH CARE EDUCATION/TRAINING PROGRAM

## 2022-06-23 PROCEDURE — 2580000003 HC RX 258: Performed by: STUDENT IN AN ORGANIZED HEALTH CARE EDUCATION/TRAINING PROGRAM

## 2022-06-23 PROCEDURE — 84132 ASSAY OF SERUM POTASSIUM: CPT

## 2022-06-23 PROCEDURE — 84100 ASSAY OF PHOSPHORUS: CPT

## 2022-06-23 PROCEDURE — 85025 COMPLETE CBC W/AUTO DIFF WBC: CPT

## 2022-06-23 PROCEDURE — 82550 ASSAY OF CK (CPK): CPT

## 2022-06-23 PROCEDURE — 51701 INSERT BLADDER CATHETER: CPT

## 2022-06-23 PROCEDURE — 92526 ORAL FUNCTION THERAPY: CPT

## 2022-06-23 PROCEDURE — 80048 BASIC METABOLIC PNL TOTAL CA: CPT

## 2022-06-23 PROCEDURE — 99233 SBSQ HOSP IP/OBS HIGH 50: CPT | Performed by: INTERNAL MEDICINE

## 2022-06-23 PROCEDURE — 83735 ASSAY OF MAGNESIUM: CPT

## 2022-06-23 PROCEDURE — 82947 ASSAY GLUCOSE BLOOD QUANT: CPT

## 2022-06-23 RX ORDER — TAMSULOSIN HYDROCHLORIDE 0.4 MG/1
0.4 CAPSULE ORAL DAILY
Status: DISCONTINUED | OUTPATIENT
Start: 2022-06-23 | End: 2022-06-24 | Stop reason: HOSPADM

## 2022-06-23 RX ORDER — TAMSULOSIN HYDROCHLORIDE 0.4 MG/1
0.4 CAPSULE ORAL DAILY
Qty: 30 CAPSULE | Refills: 0 | Status: SHIPPED | OUTPATIENT
Start: 2022-06-23

## 2022-06-23 RX ORDER — CEPHALEXIN 500 MG/1
500 CAPSULE ORAL 4 TIMES DAILY
Qty: 4 CAPSULE | Refills: 0 | Status: SHIPPED | OUTPATIENT
Start: 2022-06-23 | End: 2022-06-24

## 2022-06-23 RX ORDER — HEPARIN SODIUM 5000 [USP'U]/ML
5000 INJECTION, SOLUTION INTRAVENOUS; SUBCUTANEOUS EVERY 8 HOURS SCHEDULED
Status: DISCONTINUED | OUTPATIENT
Start: 2022-06-23 | End: 2022-06-24 | Stop reason: HOSPADM

## 2022-06-23 RX ADMIN — TAMSULOSIN HYDROCHLORIDE 0.4 MG: 0.4 CAPSULE ORAL at 12:52

## 2022-06-23 RX ADMIN — HEPARIN SODIUM 5000 UNITS: 5000 INJECTION INTRAVENOUS; SUBCUTANEOUS at 15:06

## 2022-06-23 RX ADMIN — SODIUM CHLORIDE, PRESERVATIVE FREE 10 ML: 5 INJECTION INTRAVENOUS at 09:10

## 2022-06-23 RX ADMIN — HEPARIN SODIUM 5000 UNITS: 5000 INJECTION INTRAVENOUS; SUBCUTANEOUS at 21:14

## 2022-06-23 RX ADMIN — SODIUM CHLORIDE, PRESERVATIVE FREE 10 ML: 5 INJECTION INTRAVENOUS at 09:11

## 2022-06-23 RX ADMIN — SODIUM CHLORIDE, PRESERVATIVE FREE 10 ML: 5 INJECTION INTRAVENOUS at 00:15

## 2022-06-23 RX ADMIN — SODIUM CHLORIDE, PRESERVATIVE FREE 10 ML: 5 INJECTION INTRAVENOUS at 21:16

## 2022-06-23 RX ADMIN — CEFTRIAXONE 1000 MG: 1 INJECTION, POWDER, FOR SOLUTION INTRAMUSCULAR; INTRAVENOUS at 00:15

## 2022-06-23 ASSESSMENT — PAIN SCALES - PAIN ASSESSMENT IN ADVANCED DEMENTIA (PAINAD)
FACIALEXPRESSION: 0
FACIALEXPRESSION: 0
BODYLANGUAGE: 0
BREATHING: 0
CONSOLABILITY: 0
BREATHING: 0
NEGVOCALIZATION: 0
CONSOLABILITY: 0
BODYLANGUAGE: 0
FACIALEXPRESSION: 0
CONSOLABILITY: 0
FACIALEXPRESSION: 0
BODYLANGUAGE: 0
BREATHING: 0
TOTALSCORE: 0
NEGVOCALIZATION: 0
BODYLANGUAGE: 0
CONSOLABILITY: 0
FACIALEXPRESSION: 0
NEGVOCALIZATION: 0
TOTALSCORE: 0
TOTALSCORE: 0
BREATHING: 0
TOTALSCORE: 0
CONSOLABILITY: 0
TOTALSCORE: 0
NEGVOCALIZATION: 0
BODYLANGUAGE: 0
BREATHING: 0
NEGVOCALIZATION: 0

## 2022-06-23 ASSESSMENT — PAIN SCALES - WONG BAKER
WONGBAKER_NUMERICALRESPONSE: 0

## 2022-06-23 ASSESSMENT — PAIN SCALES - GENERAL
PAINLEVEL_OUTOF10: 0

## 2022-06-23 NOTE — PROGRESS NOTES
resolves. Dysphagia Diagnosis: Mild to moderate pharyngeal stage dysphagia,Moderate to severe oral stage dysphagia    MBS results- not appropriate at this time    Pain: did not clearly state    Current Diet : ADULT DIET; Dysphagia - Pureed   Recommended Form of Meds: Crushed in puree as able  Compensatory Swallowing Strategies : Total feed,Upright as possible for all oral intake,Eat/Feed slowly,Alternate solids and liquids,Small bites/sips,Assist feed,External pacing     Treatment:  Pt seen bedside to address the following goals:  Short-term Goals  Goal 1: Pt will participate in dynamic swallow assessment  6/21: Pt seen seated edge of bed with PT/OT. Awake and alert, but still lethargic, requiring max cues. Keeping head turned to right. On room air. Pt with positive but limited oral acceptance PO, oral holding, seemingly delayed and suspect reduced swallow movement, no overt signs of aspiration. Given limited PO acceptance and ongoing lethargy, doubt pt's ability to maintain adequate nutrition/hydration needs via PO, and given prolonged oral holding/swallow delay, recommend continue NPO, exceptions ice chips/sips water with nursing staff when fully alert/seated upright. Cont goal  6/22: cleared by Rn to see pt. Received pt more alert/interactive this date than when seen yesterday. Repositioned up in bed. Assessed tolerance thins via tsp/straw, puree, and jello. Pt able to use straw today for first time. Positive oral acceptance, increased timeliness of laryngeal swallow movement, good oral clearance. Inconsistent delayed cough. When questioned, pt answered 'yes' to having reflux; question whether that was cause of cough, or if delayed cough from possible aspiration. Recommend further assessment of swallow via instrumental. D/w palliative NP and patient. 6/23: pt upgraded to puree and thin liquids yesterday per MBS results. Per RN, pt has been tolerating diet well.  Received pt awake, alert, resting in bed, on room air. Repositioned up and assessed tolerance current diet (puree + thins via straw); pt continues to require 1:1 assist feed. Pt with positive oral acceptance, slowed but adequate oral prep, good oral clearance, no overt signs of aspiration. Given slowed oral prep, inability to feed self, and ongoing generalized weakness/lethargy, recommend continue pureed diet at this time. Cont goal    Goal 2: Pt will initiate oral diet when indicated safe by SLP  6/22: TBD pending MBS; goal met via mbs, d/c goal    Goal 3: Pt will demonstrate understanding of recommendations from SLP  6/21: patient educated to purpose of visit, role of SLP, swallowing; no indication of comprehension. No family present  6/22: educated pt to purpose of visit, swallow function/concerns, MBS recommendation. Questionable comprehension, no family present. 6/23: received pt alone, no family present. Ongoing education provided re: MBS, dysphagia, swallow function, recommendations; questionable comprehension  Cont goal    Patient/Family/Caregiver Education:  See above    Compensatory Strategies:  Compensatory Swallowing Strategies : Total feed,Upright as possible for all oral intake,Eat/Feed slowly,Alternate solids and liquids,Small bites/sips,Assist feed,External pacing      Plan  Diet Recommendations:     - puree / thin liquids / meds in puree    Discharge Plan:  TBD  Discussed with RN, Padmaja  Needs within reach. Electronically Signed by:  Delvin Boswell M.A., Tina Sunshine   Speech-Language Pathologist  Pager #[phone]    This document will serve as a discharge summary if pt discharges before next treatment.

## 2022-06-23 NOTE — CARE COORDINATION
Case Management Assessment           Daily Note                 Date/ Time of Note: 6/23/2022 2:02 PM         Note completed by: Yonathan Galvin RN    Patient Name: Kayy Hawk  YOB: 1935    Diagnosis:STEMI (ST elevation myocardial infarction) ([facility] [I21.3]  NSTEMI (non-ST elevated myocardial infarction) ([facility] [I21.4]  Altered mental status, unspecified altered mental status type [R41.82]  Patient Admission Status: Inpatient    Date of Admission:6/18/2022 11:26 AM Length of Stay: 5 GLOS: GMLOS: 4.3    Current Plan of Care: awaiting pre-cert for SNF (peer to peer- offered due to intent to deny)  ________________________________________________________________________________________  PT AM-PAC: 6 / 24 per last evaluation on: 06.22.2022    OT AM-PAC: 6 / 24 per last evaluation on: 06.21.2022    DME Needs for discharge: deferred  ________________________________________________________________________________________  Discharge Plan: [facility]: [facility]    Tentative discharge date: 06/24/22     Current barriers to discharge: pending peer to peer with insurance    Referrals completed: Not Applicable    Resources/ information provided: Not indicated at this time  ________________________________________________________________________________________  Case Management Notes: CM continues to follow for DC planning and needs. Patient awaiting pre-cert for SNF, [facility] notified by Anna Padilla in admissions that she received notice from insurance with intent to deny. There is option for peer to peer, [facility] has sent perfect serve to attending MD with request for contact information and time for peer to peer to be done by 06/24/22 at noon. Peer to Peer scheduled by [facility] at [phone] for Dr France Cook between 10a-12pm 06/24/22 for peer to peer with insurance. Mingo and her family were provided with choice of provider; she and her family are in agreement with the discharge plan.     Care Transition Patient: Vi Robin RN  [facility]  Case Management Department  Ph: [phone]  Fax: [phone]

## 2022-06-23 NOTE — PROGRESS NOTES
Anesthetic History Increased risk of difficult airway (no difficult intubation, just sore throat after intubation x 1) and PONV Review of Systems / Medical History Patient summary reviewed and pertinent labs reviewed Pulmonary Within defined limits Neuro/Psych Within defined limits Cardiovascular Hypertension: well controlled Dysrhythmias Exercise tolerance: >4 METS Comments: Hx PSVT--s/p ablation; on atenolol GI/Hepatic/Renal 
  
GERD: well controlled Renal disease: stones Hiatal hernia Endo/Other Within defined limits Other Findings Comments: ECHO normal  
 
 
 
 
Physical Exam 
 
Airway Mallampati: II 
TM Distance: 4 - 6 cm Neck ROM: normal range of motion Mouth opening: Normal 
 
 Cardiovascular Rhythm: regular Dental 
No notable dental hx Pulmonary Breath sounds clear to auscultation Abdominal 
 
 
 
 Other Findings Anesthetic Plan ASA: 3 Anesthesia type: general 
 
 
 
 
Induction: Intravenous Anesthetic plan and risks discussed with: Patient Progress Note    Admit Date: 6/18/2022  Day: 6  Diet: ADULT DIET; Dysphagia - Pureed    CC: AMS    Interval history: NAEO, Pt is awake this morning. She is able to state her name and place, she knows she is in the hospital. Denies pain    HPI: Sp nelson 81 yo F with PMH of demenita, HLD, CAD, CKD, HFrEF Who presents from home. For AMS, pt sitting at home was slumped over in her chair and brought to the ED. Pt was altered on arrival, non-verbal but opens eyes and responds to pain.     In the ED EKG showed ST elevation in lateral and inferior leads code STEMI activated but pt did not get any intervention due to being altered. Pt afebrile, HDS. Pro-BNP elevated 7360, Troponin elevated 0.70, MIGUELITO 3.3. WBC 14.0, Hgb 7.7, platelet 60, pT 18.8, INR 1.28, aPTT 20.7. U/A cloudy, specific gravity >1.030, Large blood, protein, 10-20 WBC 3+ bacteria. CTH negative for any intracranial abnormality. CXR No acute cardiopulmonary findings.     Pt admitted for further work-up of AMS.      Medications:     Scheduled Meds:   heparin (porcine)  5,000 Units SubCUTAneous 3 times per day    [START ON 6/24/2022] cefTRIAXone (ROCEPHIN) IV  1,000 mg IntraVENous Q24H    tamsulosin  0.4 mg Oral Daily    lidocaine 1 % injection  5 mL IntraDERmal Once    sodium chloride flush  5-40 mL IntraVENous 2 times per day    sodium chloride flush  5-40 mL IntraVENous 2 times per day     Continuous Infusions:   sodium chloride      sodium chloride      sodium chloride Stopped (06/21/22 1824)    dextrose 100 mL/hr (06/19/22 0814)     PRN Meds:sodium chloride, sodium chloride flush, sodium chloride, sodium chloride flush, sodium chloride, ondansetron **OR** ondansetron, acetaminophen **OR** acetaminophen, polyethylene glycol, glucose, dextrose bolus **OR** dextrose bolus, glucagon (rDNA), dextrose, perflutren lipid microspheres    Objective:   Vitals:   T-max:  Patient Vitals for the past 8 hrs:   BP Temp Temp src Pulse Resp SpO2 Weight 06/23/22 0812 118/63 97.8 °F (36.6 °C) Oral 69 18 95 % --   06/23/22 0544 -- -- -- -- -- -- 100 lb 9.6 oz (45.6 kg)       Intake/Output Summary (Last 24 hours) at 6/23/2022 1224  Last data filed at 6/23/2022 6859  Gross per 24 hour   Intake 110 ml   Output 650 ml   Net -540 ml       Review of Systems    Physical Exam  Constitutional:       Appearance: She is ill-appearing. HENT:      Head: Normocephalic and atraumatic. Mouth/Throat:      Mouth: Mucous membranes are moist.   Cardiovascular:      Rate and Rhythm: Normal rate and regular rhythm. Pulses: Normal pulses. Heart sounds: Normal heart sounds. Pulmonary:      Effort: Pulmonary effort is normal. No respiratory distress. Abdominal:      General: Abdomen is flat. Palpations: Abdomen is soft. Musculoskeletal:         General: Normal range of motion. Skin:     General: Skin is warm and dry. Neurological:      General: No focal deficit present. Psychiatric:         Mood and Affect: Mood normal.         LABS:    CBC:   Recent Labs     06/21/22  0439 06/22/22  1740 06/23/22  1127   WBC 12.5* 15.3* 12.4*   HGB 8.4* 9.6* 9.7*   HCT 24.9* 29.5* 29.6*   PLT 73* 179 204   MCV 87.8 89.8 89.5     Renal:    Recent Labs     06/20/22  1600 06/20/22  1600 06/21/22  0439 06/21/22  1450 06/21/22  2032 06/22/22  0544 06/22/22  1754   *  --  138  --   --  137  --    K 4.4  --  3.8  --   --  4.3  --      --  107  --   --  105  --    CO2 17*  --  20*  --   --  19*  --    BUN 41*  --  36*  --   --  33*  --    CREATININE 1.5*  --  1.4*  --   --  1.3*  --    GLUCOSE 98   < > 103*   < > 91 93 116*   CALCIUM 9.9  --  10.1  --   --  10.5  --    MG  --   --  2.00  --   --  2.10  --    ANIONGAP 13  --  11  --   --  13  --     < > = values in this interval not displayed.      Hepatic:   Recent Labs     06/21/22  0439 06/22/22  0544   * 265*   * 456*   BILITOT 0.6 0.6   BILIDIR <0.2 <0.2   PROT 6.2* 6.5   LABALBU 3.1* 3.3*   ALKPHOS 77 65     Troponin:   Recent Labs     06/20/22  1600   TROPONINI 0.30*     BNP: No results for input(s): BNP in the last 72 hours. Lipids:   No results for input(s): CHOL, HDL in the last 72 hours. Invalid input(s): LDLCALCU, TRIGLYCERIDE  ABGs:  No results for input(s): PHART, LCC0YIW, PO2ART, ZQF2YDA, BEART, THGBART, A6XIHSHJ, HPU5QFS in the last 72 hours. INR:   No results for input(s): INR in the last 72 hours. Lactate: No results for input(s): LACTATE in the last 72 hours. Cultures:  -----------------------------------------------------------------  RAD:   FL MODIFIED BARIUM SWALLOW W VIDEO   Final Result      No aspiration. Please correlate with the speech pathology report for additional details. US ABDOMEN LIMITED   Final Result      1. Normal ultrasound right upper quadrant abdomen. MRI BRAIN WO CONTRAST   Final Result      1. No evidence of early ischemic injury. 2. Mild cortical atrophy. 3. Moderately severe periventricular white matter disease. CT CHEST WO CONTRAST   Final Result      1. Bilateral lower lobe airspace disease, moderate on the right and mild on left. CT ABDOMEN PELVIS WO CONTRAST Additional Contrast? None   Final Result      No acute abdominopelvic abnormality. INCIDENTAL FINDINGS:      XR CHEST PORTABLE   Final Result      No acute cardiopulmonary findings. CT HEAD WO CONTRAST   Final Result      Cerebral atrophy. Evidence of diffuse chronic small vessel white matter disease bilaterally. No acute intracranial findings. Assessment/Plan:     Acute Metabolic Encephalopathy-improving  Leukocytosis-improving  Likely 2/2 infection  Initially was only responsive to pain. Pt has baseline dementia  Likely secondary to infection, POA White count 14-->18, CTH no acute findings, CXR no acute findings, Procal 18.8. U/A with WBC 10-20 and 4+ bacteria.  Empircally started on broad spectrum antibiotics of vanc and cefepime  - CTAP (6/18): no acute findings  - CT Chest (6/19): bilateral lower lobe airspace ds R>L  - MRI Brain (6/19): No evidence of early ischemic injury, mild cortical atrophy, mod severe periventricular white matter disease  - ammonia 34, WNL  - Blood cx NGTD  - Urine cx: E. Coli, sensitive to ceftriaxone  - MRSA swab negative, d/c vanc  - d/c cefepime (6/18-6/21) cont ceftriaxone (6/22), will start keflex on discharge for a total of 7 days abx  - palliative care consult, pt wants to continue full code    Dysphagia  - SLP following, plan for MBS today, swallowing better, delayed cough  - continue NPO for now    Hypoglycemia-improving  POCT glucose are likely inaccurate, pt fingers are cold. - pt getting D10 intermittently  - serum glucose checks q12  - pt started on diet    CK elevation-improving  CK 6133--> 1637-->979  - fluids as above    MIGUELITO on CKD3a?-improving  Cr 3.3, POA. 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, similar to previous EKGs  - ECHO EF 11-85%, normal diastolic function    Acute on Chronic normocytic anemia  Iron studies WNL  Vitamin B12 and folate elevated  - transfuse 1u pRBCs (6/20)    Transaminitis-improving  , 550 today, possibly due to fluctuations in BP  - hepatitis panel negative  - LFTs trending down  - RUQ U/S WNL    Thrombocytopenia- improving  Likely 2/2 infection  - platelets improving  - f/u peripheral smear    Code Status:FULL  FEN: ADULT DIET;  Dysphagia - Pureed  PPX: heparin   DISPO: IP, d/c today to SNF    Mukul Ellis MD, PGY-1  06/23/22  12:24 PM    This patient has been staffed and discussed with Leonel Smith MD.

## 2022-06-23 NOTE — PLAN OF CARE
Problem: Discharge Planning  Goal: Discharge to home or other facility with appropriate resources  Outcome: Progressing  Note: Planning to discharge to [facility]     Problem: Pain  Goal: Verbalizes/displays adequate comfort level or baseline comfort level  Outcome: Progressing  Note: Pt denies pain     Problem: Safety - Adult  Goal: Free from fall injury  Outcome: Progressing     Problem: Neurosensory - Adult  Goal: Achieves stable or improved neurological status  Outcome: Progressing     Problem: Cardiovascular - Adult  Goal: Absence of cardiac dysrhythmias or at baseline  Outcome: Progressing  Goal: Maintains optimal cardiac output and hemodynamic stability  Outcome: Progressing     Problem: Respiratory - Adult  Goal: Achieves optimal ventilation and oxygenation  Outcome: Progressing     Problem: Genitourinary - Adult  Goal: Urinary catheter remains patent  Outcome: Progressing  Flowsheets (Taken 6/21/2022 1600 by Zeus Lewis RN)  Urinary catheter remains patent: Assess patency of urinary catheter  Note: Abraham is draining clear yellow urine     Problem: Metabolic/Fluid and Electrolytes - Adult  Goal: Electrolytes maintained within normal limits  Outcome: Progressing     Problem: Skin/Tissue Integrity - Adult  Goal: Skin integrity remains intact  Outcome: Progressing     Problem: Musculoskeletal - Adult  Goal: Return mobility to safest level of function  Outcome: Progressing     Problem: Skin/Tissue Integrity  Goal: Absence of new skin breakdown  Description: 1. Monitor for areas of redness and/or skin breakdown  2. Assess vascular access sites hourly  3. Every 4-6 hours minimum:  Change oxygen saturation probe site  4. Every 4-6 hours:  If on nasal continuous positive airway pressure, respiratory therapy assess nares and determine need for appliance change or resting period.   Outcome: Progressing     Problem: ABCDS Injury Assessment  Goal: Absence of physical injury  Outcome: Progressing Problem: Chronic Conditions and Co-morbidities  Goal: Patient's chronic conditions and co-morbidity symptoms are monitored and maintained or improved  Outcome: Progressing

## 2022-06-23 NOTE — CONSULTS
Nephrology Progress Note                                                                                                                                                                                                                                                                                                                                                               Office : [phone]     Fax :[phone]              Patient's Name: Kuldeep Rivera  6/23/2022    History of Present Ilness:    Kuldeep Rivera is a 82 y.o. past medical history significant for CKD, hypertension, hyperlipidemia, cardiomyopathy with LV ejection fraction of 45 to 50%, severe LVH, grade 3 diastolic dysfunction, no major obstructive CAD in 2013, was brought into the hospital via EMS secondary to altered mental status. History is limited from the patient. Cr elevated     Interval History: NAONE. Patient was not able to provide ROS today.  by straight cath. 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 No pertinent surgical history. History reviewed. No pertinent family history. has an unknown smoking status. She has never used smokeless tobacco. She reports that she does not drink alcohol and does not use drugs. Allergies:  Patient has no known allergies.     Current Medications:    cefTRIAXone (ROCEPHIN) 1000 mg IVPB in 50 mL D5W minibag, Q24H  0.9 % sodium chloride infusion, PRN  lidocaine PF 1 % injection 5 mL, Once  sodium chloride flush 0.9 % injection 5-40 mL, 2 times per day  sodium chloride flush 0.9 % injection 5-40 mL, PRN  0.9 % sodium chloride infusion, PRN  sodium chloride flush 0.9 % injection 5-40 mL, 2 times per day  sodium chloride flush 0.9 % injection 5-40 mL, PRN  0.9 % sodium chloride infusion, PRN  ondansetron (ZOFRAN-ODT) disintegrating tablet 4 mg, Q8H PRN   Or  ondansetron (ZOFRAN) injection 4 mg, Q6H PRN  acetaminophen (TYLENOL) tablet 650 mg, Q6H PRN   Or  acetaminophen (TYLENOL) suppository 650 mg, Q6H PRN  polyethylene glycol (GLYCOLAX) packet 17 g, Daily PRN  glucose chewable tablet 16 g, PRN  dextrose bolus 10% 125 mL, PRN   Or  dextrose bolus 10% 250 mL, PRN  glucagon (rDNA) injection 1 mg, PRN  dextrose 5 % solution, PRN  perflutren lipid microspheres (DEFINITY) injection 1.65 mg, ONCE PRN        Review of Systems:   14 point ROS obtained but were negative except mentioned in HPI      Physical exam:     Vitals:  /63   Pulse 69   Temp 97.8 °F (36.6 °C) (Oral)   Resp 18   Ht 5' (1.524 m)   Wt 100 lb 9.6 oz (45.6 kg)   SpO2 95%   BMI 19.65 kg/m²   Constitutional:  OAA X3 NAD  Skin: no rash, turgor wnl  Heent:  eomi, mmm  Neck: no bruits or jvd noted  Cardiovascular:  S1, S2 without m/r/g  Respiratory: CTA B without w/r/r  Abdomen:  +bs, soft, nt, nd  Ext: + lower extremity edema  Psychiatric: mood and affect appropriate  Musculoskeletal:  Rom, muscular strength intact    Data:   Labs:  CBC:   Recent Labs     06/20/22  1847 06/21/22  0439 06/22/22  1740   WBC  --  12.5* 15.3*   HGB 10.2* 8.4* 9.6*   PLT  --  73* 179     BMP:    Recent Labs     06/20/22  1600 06/20/22  1600 06/21/22  0439 06/21/22  1450 06/21/22  2032 06/22/22  0544 06/22/22  1754   *  --  138  --   --  137  --    K 4.4  --  3.8  --   --  4.3  --      --  107  --   --  105  --    CO2 17*  --  20*  --   --  19*  --    BUN 41*  --  36*  --   --  33*  --    CREATININE 1.5*  --  1.4*  --   --  1.3*  --    GLUCOSE 98   < > 103*   < > 91 93 116*    < > = values in this interval not displayed.      Ca/Mg/Phos:   Recent Labs     06/20/22  1600 06/21/22  0439 06/22/22  0544   CALCIUM 9.9 10.1 10.5   MG  --  2.00 2.10     Hepatic:   Recent Labs     06/21/22  0439 06/22/22  0544   * 265*   ALT 527* 456*   BILITOT 0.6 0.6   ALKPHOS 77 65     Troponin:   Recent Labs     06/20/22  1600   TROPONINI 0.30*     BNP: No results for input(s): BNP in the last 72 hours. Lipids:   No results for input(s): CHOL, TRIG, HDL, LDLCALC, LABVLDL in the last 72 hours. ABGs: No results for input(s): PHART, PO2ART, [id number] in the last 72 hours. INR:   No results for input(s): INR in the last 72 hours. UA:  No results for input(s): [address], GLUCOSEU, BILIRUBINUR, KETUA, SPECGRAV, BLOODU, PHUR, PROTEINU, UROBILINOGEN, NITRU, LEUKOCYTESUR, [place] in the last 72 hours. Urine Microscopic:   No results for input(s): LABCAST, BACTERIA, COMU, HYALCAST, WBCUA, RBCUA, EPIU in the last 72 hours. Urine Culture:   No results for input(s): LABURIN in the last 72 hours. Urine Chemistry:   No results for input(s): Marisela Quitter, PROTEINUR, NAUR in the last 72 hours. IMAGING:  FL MODIFIED BARIUM SWALLOW W VIDEO   Final Result      No aspiration. Please correlate with the speech pathology report for additional details. US ABDOMEN LIMITED   Final Result      1. Normal ultrasound right upper quadrant abdomen. MRI BRAIN WO CONTRAST   Final Result      1. No evidence of early ischemic injury. 2. Mild cortical atrophy. 3. Moderately severe periventricular white matter disease. CT CHEST WO CONTRAST   Final Result      1. Bilateral lower lobe airspace disease, moderate on the right and mild on left. CT ABDOMEN PELVIS WO CONTRAST Additional Contrast? None   Final Result      No acute abdominopelvic abnormality. INCIDENTAL FINDINGS:      XR CHEST PORTABLE   Final Result      No acute cardiopulmonary findings. CT HEAD WO CONTRAST   Final Result      Cerebral atrophy. Evidence of diffuse chronic small vessel white matter disease bilaterally. No acute intracranial findings. Assessment/Plan   1. MIGUELITO     2. HTN    3.  Anemia    4. Acid- base/ Electrolyte imbalance     5.  AMS       Plan   - st cath TID   - monitor Off IVF   - monitor calorie intake   - Creatinine stable (recent 1.3)  - stopped diuretics   - Monitor UO and renal function   - ECHO found normal LV size and normal EF 60-65%  - Urine Protein/Creat ratio 0.5  - ProteinUr 28   CrUr 55.4     Recommend to dose adjust all medications  based on renal functions  Maintain SBP> 90 mmHg   Daily weights   AVOID NSAIDs  Avoid Nephrotoxins  Monitor Intake/Output  Call if significant decrease in urine output       [address] MS[facility] acting as scribe        Thank you for allowing us to participate in care of [address] [place] [address] free to contact me   [facility] of [address]  Office : [phone]  Fax :[phone]

## 2022-06-23 NOTE — CARE COORDINATION
P2P arranged for 1015am 6/24/2022 with Dr. Fabiana Matthews. Sent a Perfect Serve message to Dr. Fabiana Matthews.     Electronically signed by KILLIAN Fontanez RN-[facility]  [phone]

## 2022-06-23 NOTE — PLAN OF CARE
Problem: Pain  Goal: Verbalizes/displays adequate comfort level or baseline comfort level  6/23/2022 0223 by Manpreet Yun RN  Outcome: Progressing  6/22/2022 1712 by Erika Sawant RN  Outcome: Progressing  Flowsheets (Taken 6/21/2022 1533 by Kelton Barnes RN)  Verbalizes/displays adequate comfort level or baseline comfort level:   Encourage patient to monitor pain and request assistance   Assess pain using appropriate pain scale   Administer analgesics based on type and severity of pain and evaluate response   Implement non-pharmacological measures as appropriate and evaluate response  Note: Pt has pain with movement. Denies wanting any meds     Problem: Safety - Adult  Goal: Free from fall injury  6/23/2022 0223 by Manpreet Yun RN  Outcome: Progressing  6/22/2022 1712 by Erika Sawant RN  Outcome: Progressing     Problem: Skin/Tissue Integrity - Adult  Goal: Skin integrity remains intact  6/23/2022 0223 by Manpreet Yun RN  Outcome: Progressing  6/22/2022 1712 by Erika Sawant RN  Outcome: Progressing     Problem: Skin/Tissue Integrity  Goal: Absence of new skin breakdown  Description: 1. Monitor for areas of redness and/or skin breakdown  2. Assess vascular access sites hourly  3. Every 4-6 hours minimum:  Change oxygen saturation probe site  4. Every 4-6 hours:  If on nasal continuous positive airway pressure, respiratory therapy assess nares and determine need for appliance change or resting period. 6/23/2022 0223 by Manpreet Yun RN  Outcome: Progressing  6/22/2022 1712 by Erika Sawant RN  Outcome: Progressing     Skin integrity maintained by frequent repositioning. Incontinence care provided as needed. Minimum layers utilized under pt to reduce friction/shearing and pressure injury. High fall precautions in place including sign, arm band, bed locked in low position, and bed alarm engaged. Call light within reach.  Pt instructed to use call light and not to get OOB alone.

## 2022-06-23 NOTE — PROGRESS NOTES
Physician Progress Note      Diana Bell  CSN #:                  [id number]  :                       1935  ADMIT DATE:       2022 11:26 AM  DISCH DATE:  RESPONDING  PROVIDER #:        Elodia Banda          QUERY TEXT:    Patient admitted with MIGUELITO. Noted documentation of HFpEF acute on chronic in PN   . In order to support the diagnosis of HFpEF acute on chronic, please   include additional clinical indicators in your documentation. Or please   document if the diagnosis of HFpEF acute on chronic has been ruled out after   further study. The medical record reflects the following:  Risk Factors: 83 yo w/ hx of CHF admitted for MIGUELITO  Clinical Indicators: Per Cardiology : HFpEF acute on chronic Improving. Pro-BNP on admit 7360. Treatment: Cardiology consult, No IV Lasix currently not getting her   hydrochlorothiazide. Options provided:  -- HFpEF acute on chronic was ruled out  -- HFpEF acute on chronic present as evidenced by, Please document evidence. -- Other - I will add my own diagnosis  -- Disagree - Not applicable / Not valid  -- Disagree - Clinically unable to determine / Unknown  -- Refer to Clinical Documentation Reviewer    PROVIDER RESPONSE TEXT:    HFpEF acute on chronic was ruled out after study.     Query created by: Gabby Murphy on 2022 10:49 AM      Electronically signed by:  Elodia Banda 2022 10:55 AM

## 2022-06-23 NOTE — DISCHARGE SUMMARY
INTERNAL MEDICINE DEPARTMENT AT [address] [place] [address]  DISCHARGE SUMMARY    Patient ID: Roseann Martin                                             Discharge Date: 6/23/2022   Patient's PCP: FABRICIO Shannon - CNP                                          Discharge Physician: Kayla White MD MD  Admit Date: 6/18/2022   Admitting Physician: La Hooks MD    PROBLEMS DURING HOSPITALIZATION:  Present on Admission:   [facility]STEMI (non-ST elevated [facility] infarction) [facility] [address]   Altered mental status   MIGUELITO (acute kidney injury) ([facility] [address]   Electrolyte imbalance   Generalized weakness   Encounter for palliative care   Advance care planning      DISCHARGE DIAGNOSES:    HPI: Roseann Martin omar 81 yo F with PMH of demenita, HLD, CAD, CKD, HFrEF Who presents from home. For AMS, pt sitting at home was slumped over in her chair and brought to the ED. Pt was altered on arrival, non-verbal but opens eyes and responds to pain.     In the ED EKG showed ST elevation in lateral and inferior leads code STEMI activated but pt did not get any intervention due to being altered. Pt afebrile, HDS. Pro-BNP elevated 7360, Troponin elevated 0.70, MIGUELITO 3.3. WBC 14.0, Hgb 7.7, platelet 60, pT 02.0, INR 1.28, aPTT 20.7. U/A cloudy, specific gravity >1.030, Large blood, protein, 10-20 WBC 3+ bacteria. CTH negative for any intracranial abnormality. CXR No acute cardiopulmonary findings.     Pt admitted for STEMI and further work-up of AMS.    The following issues were addressed during hospitalization:  Acute Metabolic Encephalopathy Leukocytosis  Likely 2/2 infection  Initially was only responsive to pain. Pt has baseline dementia. POA White count 14-->18, CTH no acute findings, CXR no acute findings, Procal 18.8. U/A with WBC 10-20 and 4+ bacteria.  Empircally started on broad spectrum antibiotics of vanc and cefepime  - CTAP (6/18): no acute findings  - CT Chest (6/19): bilateral lower lobe airspace ds R>L  - MRI Brain (6/19): No evidence of early ischemic injury, mild cortical atrophy, mod severe periventricular white matter disease  - ammonia 34, WNL  - Blood cx NGTD  - Urine cx: E. Coli, sensitive to ceftriaxone  - MRSA swab negative, d/c vanc  - d/c cefepime (6/18-6/21) cont ceftriaxone (6/22), will start keflex on discharge for a total of 7 days abx  - palliative care consulted, pt wants to continue full code     Dysphagia  - SLP following, [facility] pt showed no signs of aspiration, swallowing better, delayed cough  - Dysphagia diet     Urinary retention  - pennington placed as pt was retaining  - flomax started  - pt was d/c with pennington catheter, should have voiding trial at facility     Hypoglycemia  POCT glucose are likely inaccurate, pt fingers are cold. - pt getting D10 intermittently  - serum glucose checks q12  - pt started on diet and improved     CK elevation  CK 6133 trending down after IVF     MIGUELITO on CKD3a? Cr 3.3, POA. 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 Pt has baseline dementia  Likely secondary to infection, POA White count 14-->18, CTH no acute findings, CXR no acute findings, Procal 18.8. U/A with WBC 10-20 and 4+ bacteria.  Empircally started on broad spectrum antibiotics of vanc and cefepime  - CTAP (6/18): no acute findings  - CT Chest (6/19): bilateral lower lobe airspace ds R>L  - MRI Brain (6/19): No evidence of early ischemic injury, mild cortical atrophy, mod severe periventricular white matter disease  - ammonia 34, WNL  - Blood cx NGTD  - Urine cx: E. Coli, sensitive to ceftriaxone  - MRSA swab negative, d/c vanc  - d/c cefepime (6/18-6/21) cont ceftriaxone (6/22), will start keflex on discharge for a total of 7 days abx  - palliative care consult, pt wants to continue full code     Dysphagia  - SLP following, plan for MBS today, swallowing better, delayed cough  - Dysphagia diet     Urinary retention  - pennington placed yesterday as pt was retaining  - flomax started  - remove pennington for voiding trial     Hypoglycemia-improving  POCT glucose are likely inaccurate, pt fingers are cold. - pt getting D10 intermittently  - serum glucose checks q12  - pt started on diet     CK elevation-improving  CK 6133 trending down  - fluids as above     MIGUELITO on CKD3a?-improving  Cr 3.3, POA.  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, similar to previous EKGs  - ECHO EF 04-03%, normal diastolic function     Acute on Chronic normocytic anemia  Iron studies WNL  Vitamin B12 and folate elevated  - transfuse 1u pRBCs (6/20)     Transaminitis-improving  , 550 today, possibly due to fluctuations in BP  - hepatitis panel negative  - LFTs trending down  - RUQ U/S WNL     Thrombocytopenia--resolved  Likely 2/2 infection  - platelets improving  - f/u peripheral smear     Consults: cardiology, palliative care, nephrology  Significant Diagnostic Studies:  CT scan chest  Treatments: IV hydration, abx  Disposition: SNF  Discharged Condition: Stable  Follow Up: Primary Care Physician in one week    DISCHARGE MEDICATION:       Medication List      START taking these medications    cephALEXin 500 MG capsule  Commonly known as: KEFLEX  Take 1 capsule by mouth 4 times daily for 1 day     tamsulosin 0.4 MG capsule  Commonly known as: FLOMAX  Take 1 capsule by mouth daily        CONTINUE taking these medications    vitamin C 250 MG tablet     vitamin D3 10 MCG (400 UNIT) Tabs tablet  Commonly known as: CHOLECALCIFEROL        STOP taking these medications    hydroCHLOROthiazide 12.5 MG capsule  Commonly known as: Randi Res           Where to Get Your Medications      You can get these medications from any pharmacy    Bring a paper prescription for each of these medications  · cephALEXin 500 MG capsule  · tamsulosin 0.4 MG capsule          Activity: activity as tolerated  Diet: dysphagia diet  Wound Care: none needed    Time Spent on discharge is more than 20 minutes    Signed:   Selene Alfrao MD,  MD, PGY-1  6/23/2022

## 2022-06-24 VITALS
HEIGHT: 60 IN | SYSTOLIC BLOOD PRESSURE: 163 MMHG | OXYGEN SATURATION: 100 % | RESPIRATION RATE: 16 BRPM | HEART RATE: 101 BPM | BODY MASS INDEX: 22.16 KG/M2 | WEIGHT: 112.88 LBS | DIASTOLIC BLOOD PRESSURE: 82 MMHG | TEMPERATURE: 98.5 F

## 2022-06-24 LAB
ALBUMIN SERPL-MCNC: 3.1 G/DL (ref 3.4–5)
ALP BLD-CCNC: 75 U/L (ref 40–129)
ALT SERPL-CCNC: 251 U/L (ref 10–40)
ANION GAP SERPL CALCULATED.3IONS-SCNC: 13 MMOL/L (ref 3–16)
AST SERPL-CCNC: 113 U/L (ref 15–37)
BASOPHILS ABSOLUTE: 0 K/UL (ref 0–0.2)
BASOPHILS RELATIVE PERCENT: 0.4 %
BILIRUB SERPL-MCNC: 0.3 MG/DL (ref 0–1)
BILIRUBIN DIRECT: <0.2 MG/DL (ref 0–0.3)
BILIRUBIN, INDIRECT: ABNORMAL MG/DL (ref 0–1)
BUN BLDV-MCNC: 27 MG/DL (ref 7–20)
CALCIUM SERPL-MCNC: 11.1 MG/DL (ref 8.3–10.6)
CHLORIDE BLD-SCNC: 104 MMOL/L (ref 99–110)
CO2: 21 MMOL/L (ref 21–32)
CREAT SERPL-MCNC: 1.1 MG/DL (ref 0.6–1.2)
EOSINOPHILS ABSOLUTE: 0.1 K/UL (ref 0–0.6)
EOSINOPHILS RELATIVE PERCENT: 0.6 %
GFR AFRICAN AMERICAN: 57
GFR NON-AFRICAN AMERICAN: 47
GLUCOSE BLD-MCNC: 101 MG/DL (ref 70–99)
GLUCOSE BLD-MCNC: 24 MG/DL (ref 70–99)
HCT VFR BLD CALC: 31.3 % (ref 36–48)
HEMOGLOBIN: 10.3 G/DL (ref 12–16)
LYMPHOCYTES ABSOLUTE: 0.8 K/UL (ref 1–5.1)
LYMPHOCYTES RELATIVE PERCENT: 7 %
MAGNESIUM: 1.8 MG/DL (ref 1.8–2.4)
MCH RBC QN AUTO: 29.8 PG (ref 26–34)
MCHC RBC AUTO-ENTMCNC: 32.7 G/DL (ref 31–36)
MCV RBC AUTO: 90.9 FL (ref 80–100)
MONOCYTES ABSOLUTE: 2.1 K/UL (ref 0–1.3)
MONOCYTES RELATIVE PERCENT: 18.9 %
NEUTROPHILS ABSOLUTE: 8.2 K/UL (ref 1.7–7.7)
NEUTROPHILS RELATIVE PERCENT: 73.1 %
PDW BLD-RTO: 14.9 % (ref 12.4–15.4)
PERFORMED ON: ABNORMAL
PLATELET # BLD: 236 K/UL (ref 135–450)
PMV BLD AUTO: 9.5 FL (ref 5–10.5)
POTASSIUM SERPL-SCNC: 4.6 MMOL/L (ref 3.5–5.1)
RBC # BLD: 3.45 M/UL (ref 4–5.2)
SODIUM BLD-SCNC: 138 MMOL/L (ref 136–145)
SOLUBLE TRANSFERRIN RECEPT: 1.4 MG/L (ref 1.9–4.4)
TOTAL CK: 974 U/L (ref 26–192)
TOTAL PROTEIN: 7.5 G/DL (ref 6.4–8.2)
WBC # BLD: 11.2 K/UL (ref 4–11)

## 2022-06-24 PROCEDURE — 2580000003 HC RX 258: Performed by: STUDENT IN AN ORGANIZED HEALTH CARE EDUCATION/TRAINING PROGRAM

## 2022-06-24 PROCEDURE — 36415 COLL VENOUS BLD VENIPUNCTURE: CPT

## 2022-06-24 PROCEDURE — 99232 SBSQ HOSP IP/OBS MODERATE 35: CPT | Performed by: INTERNAL MEDICINE

## 2022-06-24 PROCEDURE — 6370000000 HC RX 637 (ALT 250 FOR IP): Performed by: STUDENT IN AN ORGANIZED HEALTH CARE EDUCATION/TRAINING PROGRAM

## 2022-06-24 PROCEDURE — 82550 ASSAY OF CK (CPK): CPT

## 2022-06-24 PROCEDURE — 85025 COMPLETE CBC W/AUTO DIFF WBC: CPT

## 2022-06-24 PROCEDURE — 83735 ASSAY OF MAGNESIUM: CPT

## 2022-06-24 PROCEDURE — 80076 HEPATIC FUNCTION PANEL: CPT

## 2022-06-24 PROCEDURE — 6360000002 HC RX W HCPCS: Performed by: STUDENT IN AN ORGANIZED HEALTH CARE EDUCATION/TRAINING PROGRAM

## 2022-06-24 PROCEDURE — 80048 BASIC METABOLIC PNL TOTAL CA: CPT

## 2022-06-24 RX ADMIN — HEPARIN SODIUM 5000 UNITS: 5000 INJECTION INTRAVENOUS; SUBCUTANEOUS at 16:41

## 2022-06-24 RX ADMIN — TAMSULOSIN HYDROCHLORIDE 0.4 MG: 0.4 CAPSULE ORAL at 08:54

## 2022-06-24 RX ADMIN — SODIUM CHLORIDE, PRESERVATIVE FREE 10 ML: 5 INJECTION INTRAVENOUS at 09:03

## 2022-06-24 RX ADMIN — HEPARIN SODIUM 5000 UNITS: 5000 INJECTION INTRAVENOUS; SUBCUTANEOUS at 06:35

## 2022-06-24 RX ADMIN — CEFTRIAXONE 1000 MG: 1 INJECTION, POWDER, FOR SOLUTION INTRAMUSCULAR; INTRAVENOUS at 00:41

## 2022-06-24 ASSESSMENT — PAIN SCALES - PAIN ASSESSMENT IN ADVANCED DEMENTIA (PAINAD)
CONSOLABILITY: 0
NEGVOCALIZATION: 1
TOTALSCORE: 0
FACIALEXPRESSION: 0
BODYLANGUAGE: 0
NEGVOCALIZATION: 0
NEGVOCALIZATION: 0
CONSOLABILITY: 0
BREATHING: 0
BODYLANGUAGE: 0
BREATHING: 0
FACIALEXPRESSION: 0
NEGVOCALIZATION: 0
FACIALEXPRESSION: 0
CONSOLABILITY: 0
BODYLANGUAGE: 0
BODYLANGUAGE: 0
CONSOLABILITY: 0
TOTALSCORE: 2
FACIALEXPRESSION: 0
BODYLANGUAGE: 1
BODYLANGUAGE: 0
BREATHING: 0
NEGVOCALIZATION: 0
NEGVOCALIZATION: 0
BODYLANGUAGE: 0
TOTALSCORE: 0
BREATHING: 0
BREATHING: 0
TOTALSCORE: 0
TOTALSCORE: 0
BREATHING: 0
FACIALEXPRESSION: 0
TOTALSCORE: 0
TOTALSCORE: 0
FACIALEXPRESSION: 0
CONSOLABILITY: 0
FACIALEXPRESSION: 0
BREATHING: 0
NEGVOCALIZATION: 0

## 2022-06-24 ASSESSMENT — PAIN SCALES - WONG BAKER
WONGBAKER_NUMERICALRESPONSE: 0

## 2022-06-24 ASSESSMENT — PAIN SCALES - GENERAL
PAINLEVEL_OUTOF10: 0

## 2022-06-24 NOTE — PLAN OF CARE
dysrhythmias or at baseline:   Monitor cardiac rate and rhythm   Assess for signs of decreased cardiac output  Goal: Maintains optimal cardiac output and hemodynamic stability  6/24/2022 0518 by Leonid Krishnan RN  Outcome: Progressing  6/23/2022 1952 by Charla Bates RN  Outcome: Myra Kay (Taken 6/23/2022 1951 by Leonid Krishnan RN)  Maintains optimal cardiac output and hemodynamic stability:   Monitor blood pressure and heart rate   Monitor urine output and notify Licensed Independent Practitioner for values outside of normal range   Assess for signs of decreased cardiac output     Problem: Respiratory - Adult  Goal: Achieves optimal ventilation and oxygenation  6/24/2022 0518 by Leonid Krishnan RN  Outcome: Progressing  6/23/2022 1952 by Charla Bates RN  Outcome: Progressing  Flowsheets (Taken 6/23/2022 1951 by Leonid Krishnan RN)  Achieves optimal ventilation and oxygenation:   Assess for changes in respiratory status   Assess for changes in mentation and behavior   Position to facilitate oxygenation and minimize respiratory effort     Problem: Genitourinary - Adult  Goal: Urinary catheter remains patent  6/24/2022 0518 by Leonid Krishnan RN  Outcome: Progressing  6/23/2022 1952 by Charla Bates RN  Outcome: Progressing  Flowsheets  Taken 6/23/2022 1951 by Leonid Krishnan RN  Urinary catheter remains patent: Assess patency of urinary catheter  Taken 6/21/2022 1600 by Nixon Stock RN  Urinary catheter remains patent: Assess patency of urinary catheter  Note: Abraham is draining clear yellow urine     Problem: Metabolic/Fluid and Electrolytes - Adult  Goal: Electrolytes maintained within normal limits  6/24/2022 0518 by Leonid Krishnan RN  Outcome: Progressing  6/23/2022 1952 by Charla Bates RN  Outcome: Progressing  Flowsheets (Taken 6/23/2022 1951 by Leonid Krishnan RN)  Electrolytes maintained within normal limits: Monitor labs and assess patient for signs and RN  Outcome: Progressing  Flowsheets (Taken 6/23/2022 1951 by Ganesh Sena RN)  Care Plan - Patient's Chronic Conditions and Co-Morbidity Symptoms are Monitored and Maintained or Improved:   Monitor and assess patient's chronic conditions and comorbid symptoms for stability, deterioration, or improvement   Collaborate with multidisciplinary team to address chronic and comorbid conditions and prevent exacerbation or deterioration   Update acute care plan with appropriate goals if chronic or comorbid symptoms are exacerbated and prevent overall improvement and discharge

## 2022-06-24 NOTE — CARE COORDINATION
Case Management Assessment            Discharge Note                    Date / Time of Note: 6/24/2022 11:40 AM                  Discharge Note Completed by: Josefina Koo RN    Patient Name: Dustin Long   YOB: 1935  Diagnosis: STEMI (ST elevation myocardial infarction) ([facility] [address] [I21.3]  NSTEMI (non-ST elevated myocardial infarction) ([facility] [address] [I21.4]  Altered mental status, unspecified altered mental status type [R41.82]   Date / Time: 6/18/2022 11:26 AM    Current PCP: Norma Boyle, [facility] patient: No    Hospitalization in the last 30 days: No    Advance Directives:  Code Status: Full Code  [place] DNR form completed and on chart: Not Indicated    Financial:  Payor: Austyn Mayes / Plan: Loki Alonso / Product Type: *No Product type* /      Pharmacy:  No Pharmacies [id number] medications?:    Assistance provided by Case Management: None at this time    Does patient want to participate in local refill/ meds to beds program?: Yes    Meds To Beds General Rules:  1. Can ONLY be done Monday- Friday between 8:30am-5pm  2. Prescription(s) must be in pharmacy by 3pm to be filled same day  3. Copy of patient's insurance/ prescription drug card and patient face sheet must be sent along with the prescription(s)  4. Cost of Rx cannot be added to hospital bill. If financial assistance is needed, please contact unit  or ;  or  CANNOT provide pharmacy voucher for patients co-pays  5.  Patients can then  the prescription on their way out of the hospital at discharge, or pharmacy can deliver to the bedside if staff is available. (payment due at time of pick-up or delivery - cash, check, or card accepted)     Able to afford home medications/ co-pay costs: Yes    ADLS:  Current PT AM-PAC Score: 6 /24  Current OT AM-PAC Score: 6 /24      DISCHARGE Disposition: [facility]   Report : [phone] Fax: [phone]    LOC at discharge: Skilled  TAMARA Completed: Yes    Notification completed in HENS/PAS?:  Yes : CM has completed HENS online through secure website for SNF admission at [facility] . Document ID #:  [id number]    IMM Completed:   No  Confused    Transportation:  Transportation PLAN for discharge: EMS transportation   Mode of Transport: Ambulance stretcher - BLS  Reason for medical transport: Not Applicable  Name of [address] [place] [address],[facility] [address]: [id number]  Phone: [phone]  Time of Transport: 16:30pm    Transport form completed: Yes    Home Care:  Home Care ordered at discharge: Not [address]: Not Applicable  Orders faxed: No     Additional CM Notes: Pt to discharge to [facility] SNF at 16:30, facility , nurse and family aware. The Plan for Transition of Care is related to the following treatment goals of STEMI (ST elevation myocardial infarction) ([facility] [address] [I21.3]  NSTEMI (non-ST elevated myocardial infarction) ([facility] [address] [I21.4]  Altered mental status, unspecified altered mental status type [R41.82]    The Patient and/or patient representative Mingo and her family were provided with a choice of provider and agrees with the discharge plan Yes    Freedom of choice list was provided with basic dialogue that supports the patient's individualized plan of care/goals and shares the quality data associated with the providers.  Yes    Care Transitions patient: No    Latisha Valdivia RN  [facility]  Case Management Department  Ph: [phone]  Fax: [phone]

## 2022-06-24 NOTE — PROGRESS NOTES
Progress Note    Admit Date: 6/18/2022  Day: 6  Diet: ADULT DIET; Dysphagia - Pureed    CC: AMS    Interval history: NAEO, Pt is awake this morning. She is able to state her name and place, she knows she is in the hospital. Denies pain    HPI: Kuldeep Rivera omar 83 yo F with PMH of demenita, HLD, CAD, CKD, HFrEF Who presents from home. For AMS, pt sitting at home was slumped over in her chair and brought to the ED. Pt was altered on arrival, non-verbal but opens eyes and responds to pain.     In the ED EKG showed ST elevation in lateral and inferior leads code STEMI activated but pt did not get any intervention due to being altered. Pt afebrile, HDS. Pro-BNP elevated 7360, Troponin elevated 0.70, MIGUELITO 3.3. WBC 14.0, Hgb 7.7, platelet 60, pT 33.8, INR 1.28, aPTT 20.7. U/A cloudy, specific gravity >1.030, Large blood, protein, 10-20 WBC 3+ bacteria. CTH negative for any intracranial abnormality. CXR No acute cardiopulmonary findings.     Pt admitted for further work-up of AMS.      Medications:     Scheduled Meds:   heparin (porcine)  5,000 Units SubCUTAneous 3 times per day    cefTRIAXone (ROCEPHIN) IV  1,000 mg IntraVENous Q24H    tamsulosin  0.4 mg Oral Daily    lidocaine 1 % injection  5 mL IntraDERmal Once    sodium chloride flush  5-40 mL IntraVENous 2 times per day    sodium chloride flush  5-40 mL IntraVENous 2 times per day     Continuous Infusions:   sodium chloride      sodium chloride      sodium chloride Stopped (06/21/22 1824)    dextrose 100 mL/hr (06/19/22 0814)     PRN Meds:sodium chloride, sodium chloride flush, sodium chloride, sodium chloride flush, sodium chloride, ondansetron **OR** ondansetron, acetaminophen **OR** acetaminophen, polyethylene glycol, glucose, dextrose bolus **OR** dextrose bolus, glucagon (rDNA), dextrose, perflutren lipid microspheres    Objective:   Vitals:   T-max:  Patient Vitals for the past 8 hrs:   BP Temp Temp src Pulse Resp SpO2   06/24/22 0903 (!) 156/85 97.7 hours.  BNP: No results for input(s): BNP in the last 72 hours. Lipids:   No results for input(s): CHOL, HDL in the last 72 hours. Invalid input(s): LDLCALCU, TRIGLYCERIDE  ABGs:  No results for input(s): PHART, IKB7TFV, PO2ART, WVF6LLE, BEART, THGBART, X3FNNBOR, WCS5RLN in the last 72 hours. INR:   No results for input(s): INR in the last 72 hours. Lactate: No results for input(s): LACTATE in the last 72 hours. Cultures:  -----------------------------------------------------------------  RAD:   FL MODIFIED BARIUM SWALLOW W VIDEO   Final Result      No aspiration. Please correlate with the speech pathology report for additional details. US ABDOMEN LIMITED   Final Result      1. Normal ultrasound right upper quadrant abdomen. MRI BRAIN WO CONTRAST   Final Result      1. No evidence of early ischemic injury. 2. Mild cortical atrophy. 3. Moderately severe periventricular white matter disease. CT CHEST WO CONTRAST   Final Result      1. Bilateral lower lobe airspace disease, moderate on the right and mild on left. CT ABDOMEN PELVIS WO CONTRAST Additional Contrast? None   Final Result      No acute abdominopelvic abnormality. INCIDENTAL FINDINGS:      XR CHEST PORTABLE   Final Result      No acute cardiopulmonary findings. CT HEAD WO CONTRAST   Final Result      Cerebral atrophy. Evidence of diffuse chronic small vessel white matter disease bilaterally. No acute intracranial findings. Assessment/Plan:     Acute Metabolic Encephalopathy-improving  Leukocytosis-improving  Likely 2/2 infection  Initially was only responsive to pain. Pt has baseline dementia  Likely secondary to infection, POA White count 14-->18, CTH no acute findings, CXR no acute findings, Procal 18.8. U/A with WBC 10-20 and 4+ bacteria.  Empircally started on broad spectrum antibiotics of vanc and cefepime  - CTAP (6/18): no acute findings  - CT Chest (6/19): bilateral

## 2022-06-24 NOTE — PLAN OF CARE
Problem: Discharge Planning  Goal: Discharge to home or other facility with appropriate resources  6/24/2022 1612 by Gilmer Gaucher, RN  Outcome: Completed  6/24/2022 0518 by Mortimer Hails, RN  Outcome: Progressing     Problem: Pain  Goal: Verbalizes/displays adequate comfort level or baseline comfort level  6/24/2022 1612 by Gilmer Gaucher, RN  Outcome: Completed  6/24/2022 0518 by Mortimer Hails, RN  Outcome: Progressing     Problem: Safety - Adult  Goal: Free from fall injury  6/24/2022 1612 by Gilmer Gaucher, RN  Outcome: Completed  Flowsheets (Taken 6/24/2022 0909)  Free From Fall Injury: Based on caregiver fall risk screen, instruct family/caregiver to ask for assistance with transferring infant if caregiver noted to have fall risk factors  6/24/2022 0518 by Mortimer Hails, RN  Outcome: Progressing     Problem: Neurosensory - Adult  Goal: Achieves stable or improved neurological status  6/24/2022 1612 by Gilmer Gaucher, RN  Outcome: Completed  Flowsheets (Taken 6/24/2022 0909)  Achieves stable or improved neurological status:   Assess for and report changes in neurological status   Maintain blood pressure and fluid volume within ordered parameters to optimize cerebral perfusion and minimize risk of hemorrhage   Monitor temperature, glucose, and sodium.  Initiate appropriate interventions as ordered  6/24/2022 0518 by Mortimer Hails, RN  Outcome: Progressing     Problem: Cardiovascular - Adult  Goal: Absence of cardiac dysrhythmias or at baseline  6/24/2022 1612 by Gilmer Gaucher, RN  Outcome: Completed  6/24/2022 0518 by Mortimer Hails, RN  Outcome: Progressing  Goal: Maintains optimal cardiac output and hemodynamic stability  6/24/2022 1612 by Gilmer Gaucher, RN  Outcome: Completed  6/24/2022 0518 by Mortimer Hails, RN  Outcome: Progressing     Problem: Respiratory - Adult  Goal: Achieves optimal ventilation and oxygenation  6/24/2022 1612 by Gilmer Gaucher, RN  Outcome: Completed  6/24/2022 0518 by Adrián Marinelli RN  Outcome: Progressing     Problem: Genitourinary - Adult  Goal: Urinary catheter remains patent  6/24/2022 1612 by Rodrigo Chambers RN  Outcome: Completed  6/24/2022 0518 by Adrián Marinelli RN  Outcome: Progressing     Problem: Metabolic/Fluid and Electrolytes - Adult  Goal: Electrolytes maintained within normal limits  6/24/2022 1612 by Rodrigo Chambers RN  Outcome: Completed  6/24/2022 0518 by Adrián Marinelli RN  Outcome: Progressing     Problem: Skin/Tissue Integrity - Adult  Goal: Skin integrity remains intact  6/24/2022 1612 by Rodrigo Chambers RN  Outcome: Completed  Flowsheets (Taken 6/24/2022 0909)  Skin Integrity Remains Intact: Monitor for areas of redness and/or skin breakdown  6/24/2022 0518 by Adrián Marinelli RN  Outcome: Progressing     Problem: Musculoskeletal - Adult  Goal: Return mobility to safest level of function  6/24/2022 1612 by Rodrigo Chambers RN  Outcome: Completed  6/24/2022 0518 by Adrián Marinelli RN  Outcome: Progressing     Problem: Skin/Tissue Integrity  Goal: Absence of new skin breakdown  Description: 1. Monitor for areas of redness and/or skin breakdown  2. Assess vascular access sites hourly  3. Every 4-6 hours minimum:  Change oxygen saturation probe site  4. Every 4-6 hours:  If on nasal continuous positive airway pressure, respiratory therapy assess nares and determine need for appliance change or resting period.   6/24/2022 1612 by Rodrigo Chambers RN  Outcome: Completed  6/24/2022 0518 by Adrián Marinelli RN  Outcome: Progressing     Problem: ABCDS Injury Assessment  Goal: Absence of physical injury  6/24/2022 1612 by Rodrigo Chambers RN  Outcome: Completed  Flowsheets (Taken 6/24/2022 0909)  Absence of Physical Injury: Implement safety measures based on patient assessment  6/24/2022 0518 by Adrián Marinelli RN  Outcome: Progressing     Problem: Chronic Conditions and Co-morbidities  Goal: Patient's chronic conditions and co-morbidity symptoms are monitored and maintained or improved  6/24/2022 1612 by Shadia Smith RN  Outcome: Completed  6/24/2022 0518 by Adina Ortez RN  Outcome: Progressing

## 2022-06-24 NOTE — PROGRESS NOTES
Patient had LINDA test this morning, came back tired and slept, was incontinent of stool and pure wick is patent, scheduled tylenol and oxycodone as needed is adequate to control lower back pain, lidoderm patch on the lower back.  Right knee amputation dressing intact, poor appetite,    pca is unable to get blood from pts fingers or palm for FSBS. Morning labs show glucose level of 101.

## 2022-06-24 NOTE — CONSULTS
Nephrology Progress Note                                                                                                                                                                                                                                                                                                                                                               Office : [phone]     Fax :[phone]              Patient's Name: Tammy Mccormick  6/24/2022    History of Present Ilness:    Tammy Mccormick is a 82 y.o. past medical history significant for CKD, hypertension, hyperlipidemia, cardiomyopathy with LV ejection fraction of 45 to 50%, severe LVH, grade 3 diastolic dysfunction, no major obstructive CAD in 2013, was brought into the hospital via EMS secondary to altered mental status. History is limited from the patient. Cr elevated     Interval History: NAONE. UO 1100 by straight cath. 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 No pertinent surgical history. History reviewed. No pertinent family history. has an unknown smoking status. She has never used smokeless tobacco. She reports that she does not drink alcohol and does not use drugs. Allergies:  Patient has no known allergies.     Current Medications:    heparin (porcine) injection 5,000 Units, 3 times per day  cefTRIAXone (ROCEPHIN) 1000 mg IVPB in 50 mL D5W minibag, Q24H  tamsulosin (FLOMAX) capsule 0.4 mg, Daily  0.9 % sodium chloride infusion, PRN  lidocaine PF 1 % injection 5 mL, Once  sodium chloride flush 0.9 % injection 5-40 mL, 2 times per day  sodium chloride flush 0.9 % injection 5-40 mL, PRN  0.9 % sodium chloride infusion, PRN  sodium chloride flush 0.9 % injection 5-40 mL, 2 times per day  sodium chloride flush 0.9 % injection 5-40 mL, PRN  0.9 % sodium chloride infusion, PRN  ondansetron (ZOFRAN-ODT) disintegrating tablet 4 mg, Q8H PRN   Or  ondansetron (ZOFRAN) injection 4 mg, Q6H PRN  acetaminophen (TYLENOL) tablet 650 mg, Q6H PRN   Or  acetaminophen (TYLENOL) suppository 650 mg, Q6H PRN  polyethylene glycol (GLYCOLAX) packet 17 g, Daily PRN  glucose chewable tablet 16 g, PRN  dextrose bolus 10% 125 mL, PRN   Or  dextrose bolus 10% 250 mL, PRN  glucagon (rDNA) injection 1 mg, PRN  dextrose 5 % solution, PRN  perflutren lipid microspheres (DEFINITY) injection 1.65 mg, ONCE PRN        Review of Systems:   14 point ROS obtained but were negative except mentioned in HPI      Physical exam:     Vitals:  BP (!) 156/76   Pulse 77   Temp 98.1 °F (36.7 °C) (Oral)   Resp 17   Ht 5' (1.524 m)   Wt 112 lb 14 oz (51.2 kg)   SpO2 98%   BMI 22.04 kg/m²   Constitutional:  OAA X3 NAD  Skin: no rash, turgor wnl  Heent:  eomi, mmm  Neck: no bruits or jvd noted  Cardiovascular:  S1, S2 without m/r/g  Respiratory: CTA B without w/r/r  Abdomen:  +bs, soft, nt, nd  Ext: + lower extremity edema  Psychiatric: mood and affect appropriate  Musculoskeletal:  Rom, muscular strength intact    Data:   Labs:  CBC:   Recent Labs     06/22/22  1740 06/23/22  1127 06/24/22  0508   WBC 15.3* 12.4* 11.2*   HGB 9.6* 9.7* 10.3*    204 236     BMP:    Recent Labs     06/22/22  0544 06/22/22  1754 06/23/22  1127 06/23/22  1740 06/24/22  0508     --  135*  --  138   K 4.3  --  5.5* 4.2 4.6     --  102  --  104   CO2 19*  --  18*  --  21   BUN 33*  --  31*  --  27*   CREATININE 1.3*  --  1.2  --  1.1   GLUCOSE 93   < > 103* 128* 101*    < > = values in this interval not displayed.      Ca/Mg/Phos:   Recent Labs     06/22/22  0544 06/23/22  1127 06/24/22  0508   CALCIUM 10.5 10.9* 11.1*   MG 2.10 2.00 1.80   PHOS  --  3.3  --      Hepatic:   Recent Labs     06/22/22  0544 06/23/22  1127 06/24/22  0508   * 178* 113*   * 301* 251*   BILITOT 0.6 0.4 0.3   ALKPHOS 65 69 75     Troponin:   No results for input(s): TROPONINI in the last 72 hours. BNP: No results for input(s): BNP in the last 72 hours. Lipids:   No results for input(s): CHOL, TRIG, HDL, LDLCALC, LABVLDL in the last 72 hours. ABGs: No results for input(s): PHART, PO2ART, POH6LYE in the last 72 hours. INR:   No results for input(s): INR in the last 72 hours. UA:  No results for input(s): Windell Carrier, GLUCOSEU, BILIRUBINUR, KETUA, SPECGRAV, BLOODU, PHUR, PROTEINU, UROBILINOGEN, NITRU, LEUKOCYTESUR, Damian Columba in the last 72 hours. Urine Microscopic:   No results for input(s): LABCAST, BACTERIA, COMU, HYALCAST, WBCUA, RBCUA, EPIU in the last 72 hours. Urine Culture:   No results for input(s): LABURIN in the last 72 hours. Urine Chemistry:   No results for input(s): Fernanda Philippe, PROTEINUR, NAUR in the last 72 hours. IMAGING:  FL MODIFIED BARIUM SWALLOW W VIDEO   Final Result      No aspiration. Please correlate with the speech pathology report for additional details. US ABDOMEN LIMITED   Final Result      1. Normal ultrasound right upper quadrant abdomen. MRI BRAIN WO CONTRAST   Final Result      1. No evidence of early ischemic injury. 2. Mild cortical atrophy. 3. Moderately severe periventricular white matter disease. CT CHEST WO CONTRAST   Final Result      1. Bilateral lower lobe airspace disease, moderate on the right and mild on left. CT ABDOMEN PELVIS WO CONTRAST Additional Contrast? None   Final Result      No acute abdominopelvic abnormality. INCIDENTAL FINDINGS:      XR CHEST PORTABLE   Final Result      No acute cardiopulmonary findings. CT HEAD WO CONTRAST   Final Result      Cerebral atrophy. Evidence of diffuse chronic small vessel white matter disease bilaterally. No acute intracranial findings. Assessment/Plan   1. MIGUELITO     2. HTN    3. Anemia    4. Acid- base/ Electrolyte imbalance     5.  AMS       Plan   - st cath TID   - monitor Off IVF   - monitor calorie intake   - Creatinine stable (recent 1.3)  - stopped diuretics   - Monitor UO and renal function   - ECHO found normal LV size and normal EF 60-65%  - Urine Protein/Creat ratio 0.5  - ProteinUr 28   CrUr 55.4     Recommend to dose adjust all medications  based on renal functions  Maintain SBP> 90 mmHg   Daily weights   AVOID NSAIDs  Avoid Nephrotoxins  Monitor Intake/Output  Call if significant decrease in urine output       [facility] MS[facility] acting as Honor MD Tatiana    Thank you for allowing us to participate in care of [address] [place] [address] free to contact me   [facility] [address]  Office : [phone]  Fax :[phone]

## 2022-07-09 NOTE — ED PROVIDER NOTES
[id number]          EM RESIDENT NOTE       Date of evaluation: 6/18/2022    Chief Complaint     Altered Mental Status (usually A/O from home, per family has been unresponsive for about an hour, per EMS STEMI, withdraws from pain)      History of Present Illness     Mel Ingram is a 82 y.o. female with a past medical history of dementia, hyperlipidemia, CAD, CKD, heart failure reduced ejection fraction who presents from home with altered mental status. Found at home in her chair, slumped over unresponsive. Patient altered on arrival and not able to provide significant history. Other than stated above, no additional aggravating or alleviating factors are noted. Review of Systems   Unable to obtain due to altered mental status    Past Medical, Surgical, Family, and Social History     She has a past medical history of Amyloidosis ([address] CAD (coronary artery disease), CKD (chronic kidney disease), MI (myocardial infarction) ([facility] [address] and Systolic CHF ([facility] [address] She has no past surgical history on file. Her family history is not on file. She has an unknown smoking status. She has never used smokeless tobacco. She reports that she does not drink alcohol and does not use drugs. Medications     Discharge Medication List as of 6/24/2022  4:33 PM      CONTINUE these medications which have NOT CHANGED    Details   Ascorbic Acid (VITAMIN C) 250 MG tablet Take 250 mg by mouth dailyHistorical Med      vitamin D3 (CHOLECALCIFEROL) 10 MCG (400 UNIT) TABS tablet Take 400 Units by mouth dailyHistorical Med             Allergies     She has No Known Allergies. Physical Exam     INITIAL VITALS: BP: 128/65, Temp: 99 °F (37.2 °C), Heart Rate: 59, Resp: 16, SpO2: 100 %   General: Minimally responsive, altered, will open eyes to pain, grimaces, moves all extremities spontaneously  Eyes:  PERRL. No discharge from eyes   ENT:  No discharge from nose.  OP clear  Neck:  Supple, trachea midline  Pulmonary:   Non-labored breathing. Breath sounds clear bilaterally  Cardiac:  Regular rate and rhythm. No murmurs  Abdomen:  Soft. Non-distended. Non-tender. Musculoskeletal:  No long bone deformity. Vascular:  Extremities warm and perfused. Normal pulses in all 4 extremities  Skin:  Dry, no rashes  Extremities:  No peripheral edema  Neuro: Moves all extremities spontaneously, nonfocal, does not follow commands, mumbling and verbalizing intermittently    Diagnostic Results     EKG   Interpreted in conjunction with emergency department physician No att. providers found  Rhythm: normal sinus   Rate: normal  Axis: normal  Ectopy: none  Conduction: normal  ST Segments: elevation in  lateral leads and inferior leads  T Waves:no acute change  Q Waves: none  Clinical Impression: inferolateral STEMI    RADIOLOGY:  FL MODIFIED BARIUM SWALLOW W VIDEO   Final Result      No aspiration. Please correlate with the speech pathology report for additional details. US ABDOMEN LIMITED   Final Result      1. Normal ultrasound right upper quadrant abdomen. MRI BRAIN WO CONTRAST   Final Result      1. No evidence of early ischemic injury. 2. Mild cortical atrophy. 3. Moderately severe periventricular white matter disease. CT CHEST WO CONTRAST   Final Result      1. Bilateral lower lobe airspace disease, moderate on the right and mild on left. CT ABDOMEN PELVIS WO CONTRAST Additional Contrast? None   Final Result      No acute abdominopelvic abnormality. INCIDENTAL FINDINGS:      XR CHEST PORTABLE   Final Result      No acute cardiopulmonary findings. CT HEAD WO CONTRAST   Final Result      Cerebral atrophy. Evidence of diffuse chronic small vessel white matter disease bilaterally. No acute intracranial findings.                 LABS:   Results for orders placed or performed during the hospital encounter of 06/18/22   Culture, Urine    Specimen: Urine, clean catch   Result Value Ref Range    Organism Escherichia coli (A)     Urine Culture, Routine >100,000 CFU/ml        Susceptibility    Escherichia coli - BACTERIAL SUSCEPTIBILITY PANEL BY RIGOBERTO     ampicillin >=32 Resistant mcg/mL     ampicillin-sulbactam >=32 Resistant mcg/mL     ceFAZolin <=4 Sensitive mcg/mL     cefepime <=0.12 Sensitive mcg/mL     cefTRIAXone <=0.25 Sensitive mcg/mL     ciprofloxacin <=0.25 Sensitive mcg/mL     ertapenem <=0.12 Sensitive mcg/mL     gentamicin <=1 Sensitive mcg/mL     levofloxacin <=0.12 Sensitive mcg/mL     nitrofurantoin <=16 Sensitive mcg/mL     piperacillin-tazobactam <=4 Sensitive mcg/mL     trimethoprim-sulfamethoxazole <=20 Sensitive mcg/mL   Culture, Blood 1    Specimen: Blood   Result Value Ref Range    Blood Culture, Routine No Growth after 4 days of incubation. MRSA DNA Probe, Nasal    Specimen: Nares; Nasal   Result Value Ref Range    MRSA SCREEN RT-PCR       Negative  MRSA DNA not detected. Normal Range: Not detected     Legionella antigen, urine    Specimen: Urine voided   Result Value Ref Range    L. pneumophila Serogp 1 Ur Ag       Presumptive Negative  No Legionella pneumophila serogroup 1 antigens detected. A negative result does not exclude infection with  Legionella pneumophila serogroup 1 nor does it rule out  other microbial-caused respiratory infections or  disease caused by other serogroups of  Legionella pneumophila. Normal Range: Presumptive Negative     Strep Pneumoniae Antigen    Specimen: Urine voided   Result Value Ref Range    STREP PNEUMONIAE ANTIGEN, URINE       Presumptive Negative  Presumptive negative suggests no current or recent  pneumococcal infection.  Infection due to Strep pneumoniae  cannot be ruled out since the antigen present in the sample  may be below the detection limit of the test.  Normal Range:Presumptive Negative     MRSA DNA Probe, Nasal    Specimen: Nares   Result Value Ref Range    MRSA SCREEN RT-PCR       Negative  MRSA DNA Urine Negative Negative mg/dL    Specific Gravity, UA >=1.030 1.005 - 1.030    Blood, Urine LARGE (A) Negative    pH, UA 5.5 5.0 - 8.0    Protein,  (A) Negative mg/dL    Urobilinogen, Urine 0.2 <2.0 E.U./dL    Nitrite, Urine Negative Negative    Leukocyte Esterase, Urine Negative Negative    Microscopic Examination YES     Urine Type NotGiven     Urine Reflex to Culture Yes    CBC with Auto Differential   Result Value Ref Range    WBC 18.1 (H) 4.0 - 11.0 K/uL    RBC 2.67 (L) 4.00 - 5.20 M/uL    Hemoglobin 8.2 (L) 12.0 - 16.0 g/dL    Hematocrit 25.0 (L) 36.0 - 48.0 %    MCV 93.5 80.0 - 100.0 fL    MCH 30.7 26.0 - 34.0 pg    MCHC 32.8 31.0 - 36.0 g/dL    RDW 13.6 12.4 - 15.4 %    Platelets 45 (L) 187 - 450 K/uL    MPV 10.9 (H) 5.0 - 10.5 fL    Neutrophils % 84.2 %    Lymphocytes % 6.4 %    Monocytes % 9.1 %    Eosinophils % 0.0 %    Basophils % 0.3 %    Neutrophils Absolute 15.2 (H) 1.7 - 7.7 K/uL    Lymphocytes Absolute 1.2 1.0 - 5.1 K/uL    Monocytes Absolute 1.6 (H) 0.0 - 1.3 K/uL    Eosinophils Absolute 0.0 0.0 - 0.6 K/uL    Basophils Absolute 0.1 0.0 - 0.2 K/uL   Procalcitonin   Result Value Ref Range    Procalcitonin 18.84 (H) 0.00 - 0.15 ng/mL   Microscopic Urinalysis   Result Value Ref Range    WBC, UA 10-20 (A) 0 - 5 /HPF    RBC, UA 3-4 0 - 4 /HPF    Bacteria, UA 4+ (A) None Seen /HPF   Sodium, urine, random   Result Value Ref Range    Sodium, Ur 40 Not Established mmol/L   Creatinine, urine, random   Result Value Ref Range    Creatinine, Ur 55.5 28.0 - 259.0 mg/dL   Hepatic Function Panel   Result Value Ref Range    Total Protein 6.7 6.4 - 8.2 g/dL    Albumin 3.6 3.4 - 5.0 g/dL    Alkaline Phosphatase 54 40 - 129 U/L     (H) 10 - 40 U/L     (H) 15 - 37 U/L    Total Bilirubin 0.8 0.0 - 1.0 mg/dL    Bilirubin, Direct <0.2 0.0 - 0.3 mg/dL    Bilirubin, Indirect see below 0.0 - 1.0 mg/dL   Iron and TIBC   Result Value Ref Range    Iron 66 37 - 145 ug/dL    TIBC 187 (L) 260 - 445 ug/dL    Iron Saturation 35 15 - 50 %   Folate   Result Value Ref Range    Folate >20.00 4.78 - 24.20 ng/mL   Vitamin B12   Result Value Ref Range    Vitamin B-12 1544 (H) 211 - 911 pg/mL   Path Review, Smear   Result Value Ref Range    Blood Smear Review SLIDE READY    Troponin   Result Value Ref Range    Troponin 0.53 (HH) <0.01 ng/mL   Basic Metabolic Panel   Result Value Ref Range    Sodium 138 136 - 145 mmol/L    Potassium 4.4 3.5 - 5.1 mmol/L    Chloride 105 99 - 110 mmol/L    CO2 18 (L) 21 - 32 mmol/L    Anion Gap 15 3 - 16    Glucose 128 (H) 70 - 99 mg/dL    BUN 55 (H) 7 - 20 mg/dL    CREATININE 2.8 (H) 0.6 - 1.2 mg/dL    GFR Non- 16 (A) >60    GFR  19 (A) >60    Calcium 9.2 8.3 - 10.6 mg/dL   Magnesium   Result Value Ref Range    Magnesium 1.90 1.80 - 2.40 mg/dL   Lipid panel - fasting   Result Value Ref Range    Cholesterol, Total 125 0 - 199 mg/dL    Triglycerides 103 0 - 150 mg/dL    HDL 41 40 - 60 mg/dL    LDL Calculated 63 <100 mg/dL    VLDL Cholesterol Calculated 21 Not Established mg/dL   Vancomycin Level, Random   Result Value Ref Range    Vancomycin Rm <4.0 ug/mL   Urine Drug Screen   Result Value Ref Range    Amphetamine Screen, Urine Neg Negative <1000ng/mL    Barbiturate Screen, Ur Neg Negative <200 ng/mL    Benzodiazepine Screen, Urine Neg Negative <200 ng/mL    Cannabinoid Scrn, Ur Neg Negative <50 ng/mL    Cocaine Metabolite Screen, Urine Neg Negative <300 ng/mL    Opiate Scrn, Ur Neg Negative <300 ng/mL    PCP Screen, Urine Neg Negative <25 ng/mL    Methadone Screen, Urine Neg Negative <300 ng/mL    Propoxyphene Scrn, Ur Neg Negative <300 ng/mL    Oxycodone Urine Neg Negative <100 ng/ml    pH, UA 3.0     Drug Screen Comment: see below    Hepatic Function Panel   Result Value Ref Range    Total Protein 6.0 (L) 6.4 - 8.2 g/dL    Albumin 3.1 (L) 3.4 - 5.0 g/dL    Alkaline Phosphatase 51 40 - 129 U/L     (H) 10 - 40 U/L     (H) 15 - 37 U/L    Total Bilirubin 0.6 0.0 - 1.0 mg/dL    Bilirubin, Direct <0.2 0.0 - 0.3 mg/dL    Bilirubin, Indirect see below 0.0 - 1.0 mg/dL   Troponin   Result Value Ref Range    Troponin 0.50 (H) <0.01 ng/mL   CBC with Auto Differential   Result Value Ref Range    WBC 13.2 (H) 4.0 - 11.0 K/uL    RBC 2.40 (L) 4.00 - 5.20 M/uL    Hemoglobin 7.3 (L) 12.0 - 16.0 g/dL    Hematocrit 22.4 (L) 36.0 - 48.0 %    MCV 93.1 80.0 - 100.0 fL    MCH 30.2 26.0 - 34.0 pg    MCHC 32.4 31.0 - 36.0 g/dL    RDW 13.3 12.4 - 15.4 %    Platelets 44 (L) 174 - 450 K/uL    MPV 10.6 (H) 5.0 - 10.5 fL    Neutrophils % 83.2 %    Lymphocytes % 8.1 %    Monocytes % 8.6 %    Eosinophils % 0.0 %    Basophils % 0.1 %    Neutrophils Absolute 11.0 (H) 1.7 - 7.7 K/uL    Lymphocytes Absolute 1.1 1.0 - 5.1 K/uL    Monocytes Absolute 1.1 0.0 - 1.3 K/uL    Eosinophils Absolute 0.0 0.0 - 0.6 K/uL    Basophils Absolute 0.0 0.0 - 0.2 K/uL   Basic Metabolic Panel   Result Value Ref Range    Sodium 137 136 - 145 mmol/L    Potassium 4.1 3.5 - 5.1 mmol/L    Chloride 105 99 - 110 mmol/L    CO2 17 (L) 21 - 32 mmol/L    Anion Gap 15 3 - 16    Glucose 145 (H) 70 - 99 mg/dL    BUN 57 (H) 7 - 20 mg/dL    CREATININE 2.9 (H) 0.6 - 1.2 mg/dL    GFR Non-African American 15 (A) >60    GFR  19 (A) >60    Calcium 9.5 8.3 - 10.6 mg/dL   Troponin   Result Value Ref Range    Troponin 0.57 (HH) <0.01 ng/mL   Magnesium   Result Value Ref Range    Magnesium 1.50 (L) 1.80 - 2.40 mg/dL   Troponin   Result Value Ref Range    Troponin 0.46 (H) <0.01 ng/mL   Ammonia   Result Value Ref Range    Ammonia 34 11 - 51 umol/L   Lactic Acid   Result Value Ref Range    Lactic Acid 3.8 (H) 0.4 - 2.0 mmol/L   Hepatitis Panel, Acute   Result Value Ref Range    Hep A IgM Non-reactive Non-reactive    Hep B Core Ab, IgM Non-reactive Non-reactive    Hep B S Ag Interp Non-reactive Non-reactive    Hep C Ab Interp Non-reactive Non-reactive   Acetaminophen Level   Result Value Ref Range    Acetaminophen Level <5 (L) 10 - 30 ug/mL   CBC with Auto Differential   Result Value Ref Range    WBC 12.2 (H) 4.0 - 11.0 K/uL    RBC 2.42 (L) 4.00 - 5.20 M/uL    Hemoglobin 7.3 (L) 12.0 - 16.0 g/dL    Hematocrit 22.4 (L) 36.0 - 48.0 %    MCV 92.4 80.0 - 100.0 fL    MCH 30.2 26.0 - 34.0 pg    MCHC 32.7 31.0 - 36.0 g/dL    RDW 13.0 12.4 - 15.4 %    Platelets 41 (L) 652 - 450 K/uL    MPV 11.1 (H) 5.0 - 10.5 fL    Neutrophils % 87.2 %    Lymphocytes % 6.1 %    Monocytes % 6.5 %    Eosinophils % 0.1 %    Basophils % 0.1 %    Neutrophils Absolute 10.6 (H) 1.7 - 7.7 K/uL    Lymphocytes Absolute 0.7 (L) 1.0 - 5.1 K/uL    Monocytes Absolute 0.8 0.0 - 1.3 K/uL    Eosinophils Absolute 0.0 0.0 - 0.6 K/uL    Basophils Absolute 0.0 0.0 - 0.2 K/uL   Lactic Acid   Result Value Ref Range    Lactic Acid 2.0 0.4 - 2.0 mmol/L   Lactic Acid   Result Value Ref Range    Lactic Acid 2.1 (H) 0.4 - 2.0 mmol/L   Glucose, Random   Result Value Ref Range    Glucose 162 (H) 70 - 99 mg/dL   Lactic Acid   Result Value Ref Range    Lactic Acid 1.5 0.4 - 2.0 mmol/L   CK   Result Value Ref Range    Total CK 6,133 (H) 26 - 192 U/L   Protein / Creatinine Ratio, Urine   Result Value Ref Range    Protein, Ur 28.00 (H) <12 mg/dL    Creatinine, Ur 55.4 28.0 - 259.0 mg/dL    Protein/Creat Ratio 0.5 mg/dL   Troponin   Result Value Ref Range    Troponin 0.40 (H) <0.01 ng/mL   Basic Metabolic Panel   Result Value Ref Range    Sodium 137 136 - 145 mmol/L    Potassium 4.0 3.5 - 5.1 mmol/L    Chloride 107 99 - 110 mmol/L    CO2 19 (L) 21 - 32 mmol/L    Anion Gap 11 3 - 16    Glucose 106 (H) 70 - 99 mg/dL    BUN 45 (H) 7 - 20 mg/dL    CREATININE 2.0 (H) 0.6 - 1.2 mg/dL    GFR Non-African American 24 (A) >60    GFR  29 (A) >60    Calcium 9.8 8.3 - 10.6 mg/dL   Magnesium   Result Value Ref Range    Magnesium 2.50 (H) 1.80 - 2.40 mg/dL   CBC with Auto Differential   Result Value Ref Range    WBC 12.4 (H) 4.0 - 11.0 K/uL    RBC 2.22 (L) 4.00 - 5.20 M/uL    Hemoglobin 6.7 (LL) 12.0 - 16.0 g/dL    Hematocrit 20.7 (LL) 36.0 - 48.0 %    MCV 93.5 80.0 - 100.0 fL    MCH 30.2 26.0 - 34.0 pg    MCHC 32.3 31.0 - 36.0 g/dL    RDW 13.0 12.4 - 15.4 %    Platelets 39 (L) 360 - 450 K/uL    MPV 11.7 (H) 5.0 - 10.5 fL    Neutrophils % 87.0 %    Lymphocytes % 4.8 %    Monocytes % 8.1 %    Eosinophils % 0.1 %    Basophils % 0.0 %    Neutrophils Absolute 10.8 (H) 1.7 - 7.7 K/uL    Lymphocytes Absolute 0.6 (L) 1.0 - 5.1 K/uL    Monocytes Absolute 1.0 0.0 - 1.3 K/uL    Eosinophils Absolute 0.0 0.0 - 0.6 K/uL    Basophils Absolute 0.0 0.0 - 0.2 K/uL   Hepatic Function Panel   Result Value Ref Range    Total Protein 6.3 (L) 6.4 - 8.2 g/dL    Albumin 3.0 (L) 3.4 - 5.0 g/dL    Alkaline Phosphatase 60 40 - 129 U/L     (H) 10 - 40 U/L     (H) 15 - 37 U/L    Total Bilirubin 0.7 0.0 - 1.0 mg/dL    Bilirubin, Direct <0.2 0.0 - 0.3 mg/dL    Bilirubin, Indirect see below 0.0 - 1.0 mg/dL   Vancomycin Level, Random   Result Value Ref Range    Vancomycin Rm 13.5 ug/mL   Procalcitonin   Result Value Ref Range    Procalcitonin 5.59 (H) 0.00 - 0.15 ng/mL   Troponin   Result Value Ref Range    Troponin 0.38 (H) <0.01 ng/mL   Basic Metabolic Panel   Result Value Ref Range    Sodium 135 (L) 136 - 145 mmol/L    Potassium 4.4 3.5 - 5.1 mmol/L    Chloride 105 99 - 110 mmol/L    CO2 17 (L) 21 - 32 mmol/L    Anion Gap 13 3 - 16    Glucose 98 70 - 99 mg/dL    BUN 41 (H) 7 - 20 mg/dL    CREATININE 1.5 (H) 0.6 - 1.2 mg/dL    GFR Non- 33 (A) >60    GFR  40 (A) >60    Calcium 9.9 8.3 - 10.6 mg/dL   Vitamin D 25 Hydroxy   Result Value Ref Range    Vit D, 25-Hydroxy 55.7 >=30 ng/mL   TSH with Reflex   Result Value Ref Range    TSH 1.72 0.27 - 4.20 uIU/mL   Troponin   Result Value Ref Range    Troponin 0.30 (H) <0.01 ng/mL   Hemoglobin and Hematocrit   Result Value Ref Range    Hemoglobin 10.2 (L) 12.0 - 16.0 g/dL    Hematocrit 30.8 (L) 36.0 - 48.0 %   Basic Metabolic Panel   Result Value Ref Range Sodium 138 136 - 145 mmol/L    Potassium 3.8 3.5 - 5.1 mmol/L    Chloride 107 99 - 110 mmol/L    CO2 20 (L) 21 - 32 mmol/L    Anion Gap 11 3 - 16    Glucose 103 (H) 70 - 99 mg/dL    BUN 36 (H) 7 - 20 mg/dL    CREATININE 1.4 (H) 0.6 - 1.2 mg/dL    GFR Non- 36 (A) >60    GFR  43 (A) >60    Calcium 10.1 8.3 - 10.6 mg/dL   Magnesium   Result Value Ref Range    Magnesium 2.00 1.80 - 2.40 mg/dL   CBC with Auto Differential   Result Value Ref Range    WBC 12.5 (H) 4.0 - 11.0 K/uL    RBC 2.84 (L) 4.00 - 5.20 M/uL    Hemoglobin 8.4 (L) 12.0 - 16.0 g/dL    Hematocrit 24.9 (L) 36.0 - 48.0 %    MCV 87.8 80.0 - 100.0 fL    MCH 29.5 26.0 - 34.0 pg    MCHC 33.6 31.0 - 36.0 g/dL    RDW 14.9 12.4 - 15.4 %    Platelets 73 (L) 025 - 450 K/uL    MPV 11.7 (H) 5.0 - 10.5 fL    PLATELET SLIDE REVIEW Adequate     Neutrophils % 87.0 %    Lymphocytes % 3.4 %    Monocytes % 9.1 %    Eosinophils % 0.5 %    Basophils % 0.0 %    Neutrophils Absolute 10.9 (H) 1.7 - 7.7 K/uL    Lymphocytes Absolute 0.4 (L) 1.0 - 5.1 K/uL    Monocytes Absolute 1.1 0.0 - 1.3 K/uL    Eosinophils Absolute 0.1 0.0 - 0.6 K/uL    Basophils Absolute 0.0 0.0 - 0.2 K/uL   Hepatic Function Panel   Result Value Ref Range    Total Protein 6.2 (L) 6.4 - 8.2 g/dL    Albumin 3.1 (L) 3.4 - 5.0 g/dL    Alkaline Phosphatase 77 40 - 129 U/L     (H) 10 - 40 U/L     (H) 15 - 37 U/L    Total Bilirubin 0.6 0.0 - 1.0 mg/dL    Bilirubin, Direct <0.2 0.0 - 0.3 mg/dL    Bilirubin, Indirect see below 0.0 - 1.0 mg/dL   CK   Result Value Ref Range    Total CK 1,637 (H) 26 - 192 U/L   Transferrin   Result Value Ref Range    Transferrin 125.0 (L) 200.0 - 360.0 mg/dL   Ferritin   Result Value Ref Range    Ferritin 601.4 (H) 15.0 - 150.0 ng/mL   Soluble transferrin receptor   Result Value Ref Range    Soluble Transferrin Recept 1.4 (L) 1.9 - 4.4 mg/L   Glucose, Random   Result Value Ref Range    Glucose 87 70 - 99 mg/dL   Glucose, Random   Result Value Ref Range    Glucose 424 (H) 70 - 99 mg/dL   Basic Metabolic Panel   Result Value Ref Range    Sodium 137 136 - 145 mmol/L    Potassium 4.3 3.5 - 5.1 mmol/L    Chloride 105 99 - 110 mmol/L    CO2 19 (L) 21 - 32 mmol/L    Anion Gap 13 3 - 16    Glucose 93 70 - 99 mg/dL    BUN 33 (H) 7 - 20 mg/dL    CREATININE 1.3 (H) 0.6 - 1.2 mg/dL    GFR Non-African American 39 (A) >60    GFR  47 (A) >60    Calcium 10.5 8.3 - 10.6 mg/dL   Magnesium   Result Value Ref Range    Magnesium 2.10 1.80 - 2.40 mg/dL   Hepatic Function Panel   Result Value Ref Range    Total Protein 6.5 6.4 - 8.2 g/dL    Albumin 3.3 (L) 3.4 - 5.0 g/dL    Alkaline Phosphatase 65 40 - 129 U/L     (H) 10 - 40 U/L     (H) 15 - 37 U/L    Total Bilirubin 0.6 0.0 - 1.0 mg/dL    Bilirubin, Direct <0.2 0.0 - 0.3 mg/dL    Bilirubin, Indirect see below 0.0 - 1.0 mg/dL   CK   Result Value Ref Range    Total  (H) 26 - 192 U/L   Glucose, Random   Result Value Ref Range    Glucose 91 70 - 99 mg/dL   CBC with Auto Differential   Result Value Ref Range    WBC 15.3 (H) 4.0 - 11.0 K/uL    RBC 3.29 (L) 4.00 - 5.20 M/uL    Hemoglobin 9.6 (L) 12.0 - 16.0 g/dL    Hematocrit 29.5 (L) 36.0 - 48.0 %    MCV 89.8 80.0 - 100.0 fL    MCH 29.2 26.0 - 34.0 pg    MCHC 32.5 31.0 - 36.0 g/dL    RDW 14.7 12.4 - 15.4 %    Platelets 769 718 - 987 K/uL    MPV 10.3 5.0 - 10.5 fL    SLIDE REVIEW see below     Neutrophils % 78.0 %    Lymphocytes % 12.0 %    Monocytes % 8.0 %    Eosinophils % 1.0 %    Basophils % 0.0 %    Neutrophils Absolute 12.1 (H) 1.7 - 7.7 K/uL    Lymphocytes Absolute 1.8 1.0 - 5.1 K/uL    Monocytes Absolute 1.2 0.0 - 1.3 K/uL    Eosinophils Absolute 0.2 0.0 - 0.6 K/uL    Basophils Absolute 0.0 0.0 - 0.2 K/uL    Myelocyte Percent 1 (A) %   Procalcitonin   Result Value Ref Range    Procalcitonin 1.32 (H) 0.00 - 0.15 ng/mL   Basic Metabolic Panel   Result Value Ref Range    Sodium 135 (L) 136 - 145 mmol/L    Potassium 5.5 (H) 3.5 - 5.1 mmol/L Chloride 102 99 - 110 mmol/L    CO2 18 (L) 21 - 32 mmol/L    Anion Gap 15 3 - 16    Glucose 103 (H) 70 - 99 mg/dL    BUN 31 (H) 7 - 20 mg/dL    CREATININE 1.2 0.6 - 1.2 mg/dL    GFR Non- 43 (A) >60    GFR  52 (A) >60    Calcium 10.9 (H) 8.3 - 10.6 mg/dL   Magnesium   Result Value Ref Range    Magnesium 2.00 1.80 - 2.40 mg/dL   CBC with Auto Differential   Result Value Ref Range    WBC 12.4 (H) 4.0 - 11.0 K/uL    RBC 3.31 (L) 4.00 - 5.20 M/uL    Hemoglobin 9.7 (L) 12.0 - 16.0 g/dL    Hematocrit 29.6 (L) 36.0 - 48.0 %    MCV 89.5 80.0 - 100.0 fL    MCH 29.4 26.0 - 34.0 pg    MCHC 32.8 31.0 - 36.0 g/dL    RDW 14.6 12.4 - 15.4 %    Platelets 112 771 - 729 K/uL    MPV 10.5 5.0 - 10.5 fL    Neutrophils % 76.3 %    Lymphocytes % 4.7 %    Monocytes % 18.0 %    Eosinophils % 1.0 %    Basophils % 0.0 %    Neutrophils Absolute 9.5 (H) 1.7 - 7.7 K/uL    Lymphocytes Absolute 0.6 (L) 1.0 - 5.1 K/uL    Monocytes Absolute 2.2 (H) 0.0 - 1.3 K/uL    Eosinophils Absolute 0.1 0.0 - 0.6 K/uL    Basophils Absolute 0.0 0.0 - 0.2 K/uL   Hepatic Function Panel   Result Value Ref Range    Total Protein 7.6 6.4 - 8.2 g/dL    Albumin 2.9 (L) 3.4 - 5.0 g/dL    Alkaline Phosphatase 69 40 - 129 U/L     (H) 10 - 40 U/L     (H) 15 - 37 U/L    Total Bilirubin 0.4 0.0 - 1.0 mg/dL    Bilirubin, Direct <0.2 0.0 - 0.3 mg/dL    Bilirubin, Indirect see below 0.0 - 1.0 mg/dL   CK   Result Value Ref Range    Total CK 1,012 (H) 26 - 192 U/L   Glucose, Random   Result Value Ref Range    Glucose 116 (H) 70 - 99 mg/dL   Glucose, Random   Result Value Ref Range    Glucose 128 (H) 70 - 99 mg/dL   Phosphorus   Result Value Ref Range    Phosphorus 3.3 2.5 - 4.9 mg/dL   Potassium   Result Value Ref Range    Potassium 4.2 3.5 - 5.1 mmol/L   CK   Result Value Ref Range    Total CK 1,074 (H) 26 - 192 U/L   Basic Metabolic Panel   Result Value Ref Range    Sodium 138 136 - 145 mmol/L    Potassium 4.6 3.5 - 5.1 mmol/L Performed on ACCU-CHEK    POCT Glucose   Result Value Ref Range    POC Glucose 36 (LL) 70 - 99 mg/dl    Performed on ACCU-CHEK    POCT Glucose   Result Value Ref Range    POC Glucose 212 (H) 70 - 99 mg/dl    Performed on ACCU-CHEK    POCT Glucose   Result Value Ref Range    POC Glucose 171 (H) 70 - 99 mg/dl    Performed on ACCU-CHEK    POCT Glucose   Result Value Ref Range    POC Glucose 129 (H) 70 - 99 mg/dl    Performed on ACCU-CHEK    POCT Glucose   Result Value Ref Range    POC Glucose 71 70 - 99 mg/dl    Performed on ACCU-CHEK    POCT Glucose   Result Value Ref Range    POC Glucose 68 (L) 70 - 99 mg/dl    Performed on ACCU-CHEK    POCT Glucose   Result Value Ref Range    POC Glucose 56 (L) 70 - 99 mg/dl    Performed on ACCU-CHEK    POCT Glucose   Result Value Ref Range    POC Glucose 69 (L) 70 - 99 mg/dl    Performed on ACCU-CHEK    POCT Glucose   Result Value Ref Range    POC Glucose 129 (H) 70 - 99 mg/dl    Performed on ACCU-CHEK    POCT Glucose   Result Value Ref Range    POC Glucose 68 (L) 70 - 99 mg/dl    Performed on ACCU-CHEK    POCT Glucose   Result Value Ref Range    POC Glucose 50 (L) 70 - 99 mg/dl    Performed on ACCU-CHEK    POCT Glucose   Result Value Ref Range    POC Glucose 65 (L) 70 - 99 mg/dl    Performed on ACCU-CHEK    POCT Glucose   Result Value Ref Range    POC Glucose 95 70 - 99 mg/dl    Performed on ACCU-CHEK    POCT Glucose   Result Value Ref Range    POC Glucose 108 (H) 70 - 99 mg/dl    Performed on ACCU-CHEK    POCT Glucose   Result Value Ref Range    POC Glucose 142 (H) 70 - 99 mg/dl    Performed on ACCU-CHEK    POCT Glucose   Result Value Ref Range    POC Glucose 77 70 - 99 mg/dl    Performed on ACCU-CHEK    POCT Glucose   Result Value Ref Range    POC Glucose 59 (L) 70 - 99 mg/dl    Performed on ACCU-CHEK    POCT Glucose   Result Value Ref Range    POC Glucose 113 (H) 70 - 99 mg/dl    Performed on ACCU-CHEK    POCT Glucose   Result Value Ref Range    POC Glucose 123 (H) 70 - 99 mg/dl    Performed on ACCU-CHEK    POCT Glucose   Result Value Ref Range    POC Glucose 94 70 - 99 mg/dl    Performed on ACCU-CHEK    POCT Glucose   Result Value Ref Range    POC Glucose 69 (L) 70 - 99 mg/dl    Performed on ACCU-CHEK    POCT Glucose   Result Value Ref Range    POC Glucose 108 (H) 70 - 99 mg/dl    Performed on ACCU-CHEK    POCT Glucose   Result Value Ref Range    POC Glucose 17 (LL) 70 - 99 mg/dl    Performed on ACCU-CHEK    POCT Glucose   Result Value Ref Range    POC Glucose 21 (LL) 70 - 99 mg/dl    Performed on ACCU-CHEK    POCT Glucose   Result Value Ref Range    POC Glucose 78 70 - 99 mg/dl    Performed on ACCU-CHEK    POCT Glucose   Result Value Ref Range    POC Glucose 34 (LL) 70 - 99 mg/dl    Performed on ACCU-CHEK    POCT Glucose   Result Value Ref Range    POC Glucose 41 (LL) 70 - 99 mg/dl    Performed on ACCU-CHEK    POCT Glucose   Result Value Ref Range    POC Glucose 98 70 - 99 mg/dl    Performed on ACCU-CHEK    POCT Glucose   Result Value Ref Range    POC Glucose 35 (LL) 70 - 99 mg/dl    Performed on ACCU-CHEK    POCT Glucose   Result Value Ref Range    POC Glucose 26 (LL) 70 - 99 mg/dl    Performed on ACCU-CHEK    POCT Glucose   Result Value Ref Range    POC Glucose 287 (H) 70 - 99 mg/dl    Performed on ACCU-CHEK    POCT Glucose   Result Value Ref Range    POC Glucose 85 70 - 99 mg/dl    Performed on ACCU-CHEK    POCT Glucose   Result Value Ref Range    POC Glucose 62 (L) 70 - 99 mg/dl    Performed on ACCU-CHEK    POCT Glucose   Result Value Ref Range    POC Glucose 104 (H) 70 - 99 mg/dl    Performed on ACCU-CHEK    POCT Glucose   Result Value Ref Range    POC Glucose 106 (H) 70 - 99 mg/dl    Performed on ACCU-CHEK    POCT Glucose   Result Value Ref Range    POC Glucose 37 (LL) 70 - 99 mg/dl    Performed on ACCU-CHEK    POCT Glucose   Result Value Ref Range    POC Glucose 40 (LL) 70 - 99 mg/dl    Performed on ACCU-CHEK    POCT Glucose   Result Value Ref Range    POC Glucose 60 (L) 70 - 99 mg/dl    Performed on ACCU-CHEK    POCT Glucose   Result Value Ref Range    POC Glucose 53 (L) 70 - 99 mg/dl    Performed on ACCU-CHEK    POCT Glucose   Result Value Ref Range    POC Glucose 56 (L) 70 - 99 mg/dl    Performed on ACCU-CHEK    POCT Glucose   Result Value Ref Range    POC Glucose 76 70 - 99 mg/dl    Performed on ACCU-CHEK    POCT Glucose   Result Value Ref Range    POC Glucose 98 70 - 99 mg/dl    Performed on ACCU-CHEK    POCT Glucose   Result Value Ref Range    POC Glucose 35 (LL) 70 - 99 mg/dl    Performed on ACCU-CHEK    POCT Glucose   Result Value Ref Range    POC Glucose 33 (LL) 70 - 99 mg/dl    Performed on ACCU-CHEK    POCT Glucose   Result Value Ref Range    POC Glucose 12 (LL) 70 - 99 mg/dl    Performed on ACCU-CHEK    POCT Glucose   Result Value Ref Range    POC Glucose 10 (LL) 70 - 99 mg/dl    Performed on ACCU-CHEK    POCT Glucose   Result Value Ref Range    POC Glucose 116 (H) 70 - 99 mg/dl    Performed on ACCU-CHEK    POCT Glucose   Result Value Ref Range    POC Glucose 80 70 - 99 mg/dl    Performed on ACCU-CHEK    POCT Glucose   Result Value Ref Range    POC Glucose 99 70 - 99 mg/dl    Performed on ACCU-CHEK    POCT Glucose   Result Value Ref Range    POC Glucose 14 (LL) 70 - 99 mg/dl    Performed on ACCU-CHEK    POCT Glucose   Result Value Ref Range    POC Glucose 76 70 - 99 mg/dl    Performed on ACCU-CHEK    POCT Glucose   Result Value Ref Range    POC Glucose 24 (LL) 70 - 99 mg/dl    Performed on ACCU-CHEK    EKG 12 lead   Result Value Ref Range    Ventricular Rate 71 BPM    Atrial Rate 71 BPM    P-R Interval 122 ms    QRS Duration 86 ms    Q-T Interval 406 ms    QTc Calculation (Bazett) 441 ms    P Axis 62 degrees    R Axis 48 degrees    T Axis 50 degrees    Diagnosis       Normal sinus rhythmEarly repolarizationNormal ECGDiffuse ST elevation, consider early repolarizaioon or injury Confirmed by [facility] LIOR GOOD, Stefan Biggs ([id number]) on 6/20/2022 2:01:59 PM   EKG 12 Lead   Result Value Ref Range    Ventricular Rate 76 BPM    Atrial Rate 76 BPM    P-R Interval 128 ms    QRS Duration 76 ms    Q-T Interval 378 ms    QTc Calculation (Bazett) 425 ms    P Axis 67 degrees    R Axis 62 degrees    T Axis 52 degrees    Diagnosis       EKG performed in ER and to be interpreted by ER physician. Confirmed by MD, ER ([id number]),  Fab Carter ([id number]) on 6/22/2022 7:40:03 AM   Echo Complete   Result Value Ref Range    Left Ventricular Ejection Fraction 63     LVEF MODALITY ECHO    PREPARE RBC (CROSSMATCH), 1 Units   Result Value Ref Range    Product Code Blood Bank [id number]     Description Blood Bank Red Blood Cells, Leuko-reduced     Unit Number [id number]     Dispense Status Blood Bank transfused    ABO/RH   Result Value Ref Range    ABO/Rh O POS    ANTIBODY SCREEN   Result Value Ref Range    Antibody Screen NEG        RECENT VITALS:  BP: (!) 163/82, Temp: 98.5 °F (36.9 °C), Heart Rate: (!) 101,Resp: 16, SpO2: 100 %     Procedures     ED Course     Nursing Notes, Past Medical Hx, Past Surgical Hx, Social Hx, Allergies, and Family Hx were reviewed.     The patient was given the followingmedications:  Orders Placed This Encounter   Medications    aspirin suppository 300 mg    heparin (porcine) injection 2,950 Units    DISCONTD: heparin (porcine) injection 2,950 Units    DISCONTD: heparin (porcine) injection 1,470 Units    DISCONTD: heparin 25,000 units in dextrose 5% 250 mL (premix) infusion    DISCONTD: atorvastatin (LIPITOR) tablet 40 mg    DISCONTD: donepezil (ARICEPT) tablet 10 mg    DISCONTD: DULoxetine (CYMBALTA) extended release capsule 20 mg    DISCONTD: sodium chloride flush 0.9 % injection 5-40 mL    DISCONTD: sodium chloride flush 0.9 % injection 5-40 mL    DISCONTD: 0.9 % sodium chloride infusion    DISCONTD: ondansetron (ZOFRAN-ODT) disintegrating tablet 4 mg    DISCONTD: ondansetron (ZOFRAN) injection 4 mg    DISCONTD: acetaminophen (TYLENOL) tablet 650 mg    DISCONTD: acetaminophen (TYLENOL) suppository 650 mg    DISCONTD: polyethylene glycol (GLYCOLAX) packet 17 g    DISCONTD: aspirin suppository 300 mg    DISCONTD: cefTRIAXone (ROCEPHIN) 1000 mg IVPB in 50 mL D5W minibag     Order Specific Question:   Antimicrobial Indications     Answer:   Urinary Tract Infection     Order Specific Question:   UTI duration of therapy     Answer:   3 days    DISCONTD: 0.9 % sodium chloride infusion    DISCONTD: cefepime (MAXIPIME) 1000 mg IVPB minibag     Order Specific Question:   Antimicrobial Indications     Answer:   Sepsis of Unknown Etiology     Order Specific Question:   Sepsis duration of therapy     Answer:   7 days    DISCONTD: glucose chewable tablet 16 g    DISCONTD: dextrose bolus 10% 125 mL    DISCONTD: dextrose bolus 10% 250 mL    DISCONTD: glucagon (rDNA) injection 1 mg    DISCONTD: dextrose 5 % solution    DISCONTD: vancomycin (VANCOCIN) intermittent dosing (placeholder)     Order Specific Question:   Antimicrobial Indications     Answer:   Sepsis of Unknown Etiology     Order Specific Question:   Sepsis duration of therapy     Answer:   7 days    DISCONTD: vancomycin (VANCOCIN) 1,250 mg in dextrose 5 % 250 mL IVPB     Order Specific Question:   Antimicrobial Indications     Answer:   Sepsis of Unknown Etiology     Order Specific Question:   Sepsis duration of therapy     Answer:   7 days    DISCONTD: perflutren lipid microspheres (DEFINITY) injection 1.65 mg    DISCONTD: dextrose 5 % solution    magnesium sulfate 4000 mg in 100 mL IVPB premix    DISCONTD: cefepime (MAXIPIME) 1000 mg IVPB minibag     Order Specific Question:   Antimicrobial Indications     Answer:   Sepsis of Unknown Etiology     Order Specific Question:   Sepsis duration of therapy     Answer:   7 days    DISCONTD: cefepime (MAXIPIME) 1000 mg IVPB minibag     Order Specific Question:   Antimicrobial Indications     Answer:   Sepsis of Unknown Etiology     Order Specific Question:   Sepsis duration of therapy     Answer:   7 days    vancomycin (VANCOCIN) 1,250 mg in dextrose 5 % 250 mL IVPB     Order Specific Question:   Antimicrobial Indications     Answer:   Sepsis of Unknown Etiology     Order Specific Question:   Sepsis duration of therapy     Answer:   7 days    DISCONTD: dextrose 50 % IV solution    DISCONTD: dextrose bolus 10% 125 mL    DISCONTD: dextrose bolus 10% 250 mL    0.9 % sodium chloride infusion    DISCONTD: 0.9 % sodium chloride infusion    DISCONTD: 0.9 % sodium chloride infusion    dextrose 5 % and 0.45 % sodium chloride infusion    DISCONTD: lidocaine PF 1 % injection 5 mL    DISCONTD: sodium chloride flush 0.9 % injection 5-40 mL    DISCONTD: sodium chloride flush 0.9 % injection 5-40 mL    DISCONTD: 0.9 % sodium chloride infusion    DISCONTD: cefTRIAXone (ROCEPHIN) 1000 mg IVPB in 50 mL D5W minibag     Order Specific Question:   Antimicrobial Indications     Answer:   Urinary Tract Infection     Order Specific Question:   UTI duration of therapy     Answer:   3 days    DISCONTD: heparin (porcine) injection 5,000 Units    DISCONTD: cefTRIAXone (ROCEPHIN) 1000 mg IVPB in 50 mL D5W minibag     Order Specific Question:   Antimicrobial Indications     Answer:   Urinary Tract Infection     Order Specific Question:   UTI duration of therapy     Answer:   7 days    cephALEXin (KEFLEX) 500 MG capsule     Sig: Take 1 capsule by mouth 4 times daily for 1 day     Dispense:  4 capsule     Refill:  0    DISCONTD: tamsulosin (FLOMAX) capsule 0.4 mg    tamsulosin (FLOMAX) 0.4 MG capsule     Sig: Take 1 capsule by mouth daily     Dispense:  30 capsule     Refill:  0       CONSULTS:  IP CONSULT TO HOSPITALIST  IP CONSULT TO CARDIOLOGY  IP CONSULT TO PHARMACY  PHARMACY TO DOSE VANCOMYCIN  IP CONSULT TO SOCIAL WORK  IP CONSULT TO [address] / ASSESSMENT / Fidel Mcduffieter is a 82 y.o. female with a history and presentation as described above in HPI. The patient was evaluated by myself and the ED Attending Physician, Dr. Johnathon Pike. All management and disposition plans were discussed and agreed upon. Upon presentation, the patient was well appearing and had stable vital signs. Upon arrival, EKG reviewed from EMS and in hospital with concern for inferolateral STEMI. Cath Lab was activated I spoke directly with the interventionalist.  At this time, given the confounding altered mental status, they feel her T changes are diffuse and she would not be a candidate for intervention. She received aspirin 300 mg suppository. proBNP elevated at 7000, troponin elevated at 0.7, she has an MIGUELITO with a creatinine of 3.3, leukocytosis to 14 with a hemoglobin of 7.7 and thrombocytopenia with a platelet of 60. She is an elevated INR and PTT. Urinalysis was cloudy, large blood, protein and 10-20 WBC with 3+ bacteria. CT head was negative for acute intracranial abnormality. Chest x-ray was normal.    Given persistent altered mental status and lack of improvement in exam, she will be admitted for further medical management. Discussed with family at bedside patient is full code. Parventionalists directly. Given her significant altered mental status  Medications received during this ED visit:    Medications   0.9 % sodium chloride infusion ( IntraVENous Rate/Dose Change 6/19/22 1437)   dextrose 5 % and 0.45 % sodium chloride infusion (0 mLs IntraVENous Paused 6/20/22 1645)   aspirin suppository 300 mg (300 mg Rectal Given 6/18/22 1229)   heparin (porcine) injection 2,950 Units (2,950 Units IntraVENous Given 6/18/22 1322)   magnesium sulfate 4000 mg in 100 mL IVPB premix (0 mg IntraVENous Stopped 6/19/22 0815)   vancomycin (VANCOCIN) 1,250 mg in dextrose 5 % 250 mL IVPB (0 mg IntraVENous Stopped 6/19/22 1139)       Clinical Impression     1. Altered mental status, unspecified altered mental status type    2.  NSTEMI (non-ST elevated myocardial infarction) ([facility] [address]
